# Patient Record
Sex: FEMALE | Race: WHITE | Employment: FULL TIME | URBAN - METROPOLITAN AREA
[De-identification: names, ages, dates, MRNs, and addresses within clinical notes are randomized per-mention and may not be internally consistent; named-entity substitution may affect disease eponyms.]

---

## 2017-01-31 ENCOUNTER — OFFICE VISIT (OUTPATIENT)
Dept: MIDWIFE SERVICES | Age: 27
End: 2017-01-31

## 2017-01-31 VITALS
WEIGHT: 192 LBS | HEART RATE: 78 BPM | DIASTOLIC BLOOD PRESSURE: 71 MMHG | BODY MASS INDEX: 30.99 KG/M2 | SYSTOLIC BLOOD PRESSURE: 130 MMHG

## 2017-01-31 DIAGNOSIS — N94.6 DYSMENORRHEA: Primary | ICD-10-CM

## 2017-01-31 RX ORDER — ETONOGESTREL AND ETHINYL ESTRADIOL 11.7; 2.7 MG/1; MG/1
INSERT, EXTENDED RELEASE VAGINAL
Qty: 3 DEVICE | Refills: 4 | Status: SHIPPED | OUTPATIENT
Start: 2017-01-31 | End: 2019-04-29 | Stop reason: SDUPTHER

## 2017-01-31 NOTE — PROGRESS NOTES
Chief Complaint   Patient presents with   Nathanael Campos IUD     may want it removed     Visit Vitals    /71    Pulse 78    Wt 192 lb (87.1 kg)    Breastfeeding No    BMI 30.99 kg/m2     1. Have you been to the ER, urgent care clinic since your last visit? Hospitalized since your last visit? No    2. Have you seen or consulted any other health care providers outside of the [address] [place] [address] since your last visit? Include any pap smears or colon screening. No     Here today for IUD check but may want it out. She states that her cramps are much worse than before she had it placed.

## 2017-02-01 NOTE — PROCEDURES
IUD Removal      Pre op:  Frequent heavy menses    Post op: same    Procedure: Removal of IUD    Surgeon: Dr. Valarie Chavez    Findings:  IUD removal suture is visible at [facility]    Anesthesia: None    Blood loss: None    Complications: None    Blood pressure 141/84, pulse 91, height 5' 3\" (1.6 m), weight 162 lb (73.483 kg). Procedure:     After informed consent, the patient was positioned and the cervix was visualized and cleaned with betadine. The IUD removal suture was visible posterior to the cervical OS. The suture was grasped and the IUD was removed in total without complications. The patient tolerated the procedure with minimal cramping and was allowed to rest postprocedure. Zac Chanel was seen today for checkup iud. Diagnoses and all orders for this visit:    Dysmenorrhea  -     ethinyl estradiol-etonogestrel (NUVARING) 0.12-0.015 mg/24 hr vaginal ring; Place one ring in vagina on first of month and replace 4 weeks later. Edouard Odonnell Keep unused rings refrigerated  Indications: PREGNANCY CONTRACEPTION      Follow-up Disposition: Not on File            Fabian Chavez MD, [address] [place] [address], Retired    [facility] Professor, [address]

## 2017-02-01 NOTE — PROGRESS NOTES
GYN Visit Note          Chief Complaint: Left sided pelvic pain, wants mirena out    HPI:    Latrice Oliveros  32 y.o. WF     LMP:  2017  Has had Mirena for a couple of yrs and is now having constant left sided pelvic pain and wants it removed    Review of Systems: -    General ROS: positive for  - pelvic pain  negative for - chills, fever or malaise    OBJECTIVE:  Blood pressure 130/71, pulse 78, weight 192 lb (87.1 kg), last menstrual period 2017, not currently breastfeeding. General appearance - alert, well appearing, and in no distress  Abdomen - tenderness noted left side  Pelvic - VULVA: normal appearing vulva with no masses, tenderness or lesions, VAGINA: normal appearing vagina with normal color and discharge, no lesions, CERVIX: normal appearing cervix without discharge or lesions, UTERUS: uterus is normal size, shape, consistency and nontender, IUD properly positioned in uterus, ADNEXA: small cyst in right ovary, left ovary not well visualized, exam chaperoned by Minnie Bacon RN     ASSESSMENT / PLAN:    Dl Quintero was seen today for checkup iud. Diagnoses and all orders for this visit:    Dysmenorrhea  -     ethinyl estradiol-etonogestrel (NUVARING) 0.12-0.015 mg/24 hr vaginal ring; Place one ring in vagina on first of month and replace 4 weeks later. Micah Castellanos Keep unused rings refrigerated  Indications: PREGNANCY CONTRACEPTION  -     [id number] - REMOVE INTRAUTERINE DEVICE      Follow-up Disposition:  Return in about 6 months (around 2017) for Annual exam.            Sulma Mary.  Joe Cortez MD, [address] [place] [address], Retired    Felisa Atwood Professor, [address]

## 2017-02-01 NOTE — PROCEDURES
Gyn Report [address] [facility] Page  [facility]  Patient / Exam Information Date of Exam: 2017  Name: [facility]. Phys: Pedro MARR  Patient ID: [id number] : 1990 Ref. Phys:  2nd Pat. ID: Age: 32 Sonographer: Pedro MARR  Indication: Sex: Female Exam Type: pelvic pain  LMP  LMP 2017 Day of Cycle  2 AB  Day of stim. Expected Ovul. Para 1 Ectopic  2D Measurements Value m1 m2 m3 m4 m5 m6 Meth. Uterus  Length 4.31 cm 3.58 5.05 avg. Width 4.80 cm 4.80 avg. Height 3.39 cm 3.03 3.75 avg. Volume 36.764 cm³ 27.262  Cervix Length 2.10 cm 2.10 avg. Left Ovary  Length 2.45 cm 2.45 avg. Width 2.59 cm 2.59 avg. Height 1.68 cm 1.68 avg. Volume 5.582 cm³ 5.582  Right Ovary  Length 3.14 cm 3.14 avg.  2D Measurements - Follicles  Right Follicles Avg Vol 1.360 cm³ Total Vol 3.698 cm³  D1 D2 D3 Avg. D Vol  Follicle 1 89.2 mm 99.7 mm 16.3 mm 2.223 cm³  Follicle 2 69.6 mm 28.7 mm 16.3 mm 1.876 cm³    Impression  Realtime transvaginal us of pelvis, showed normal appearing uterus with IUD properly positioned, small simple cyst in right ovary, difficult to visualize left ovary may be due to multiple cyst  Recommendation  Comply with patient desire to have IUD removed and place an suppression therapy and rescan in 6 months. Date: 2017 Perf.  Physician: Pedro MARR Sonographer: Pedro MARR

## 2017-02-03 ENCOUNTER — TELEPHONE (OUTPATIENT)
Dept: MIDWIFE SERVICES | Age: 27
End: 2017-02-03

## 2017-02-03 RX ORDER — NORETHINDRONE ACETATE AND ETHINYL ESTRADIOL 1MG-20(21)
1 KIT ORAL DAILY
Qty: 1 DOSE PACK | Refills: 2 | Status: SHIPPED | OUTPATIENT
Start: 2017-02-03 | End: 2017-05-03 | Stop reason: SDUPTHER

## 2017-02-03 NOTE — TELEPHONE ENCOUNTER
Pt calling stating Melville Lisa is not covered by insurance and would like to know if pills can be called into [facility] pharmacy listed instead. Please call.

## 2017-02-10 ENCOUNTER — TELEPHONE (OUTPATIENT)
Dept: MIDWIFE SERVICES | Age: 27
End: 2017-02-10

## 2017-02-10 RX ORDER — NORETHINDRONE ACETATE AND ETHINYL ESTRADIOL 1MG-20(21)
1 KIT ORAL DAILY
Qty: 1 DOSE PACK | Refills: 2 | OUTPATIENT
Start: 2017-02-10

## 2017-02-10 NOTE — TELEPHONE ENCOUNTER
Spoke with khadar and let her know that her bcp was called in on that last day she called on feb 3rd. She was grateful and had no questions for me.

## 2017-05-10 RX ORDER — NORETHINDRONE ACETATE AND ETHINYL ESTRADIOL AND FERROUS FUMARATE 1MG-20(21)
KIT ORAL
Qty: 28 TAB | Refills: 2 | Status: SHIPPED | OUTPATIENT
Start: 2017-05-10 | End: 2018-02-17 | Stop reason: SDUPTHER

## 2018-02-15 DIAGNOSIS — Z30.9 ENCOUNTER FOR CONTRACEPTIVE MANAGEMENT, UNSPECIFIED TYPE: Primary | ICD-10-CM

## 2018-02-15 NOTE — TELEPHONE ENCOUNTER
Patient is requesting refill of her Junel FE to hold her over until next AE. Original request was denied due to AE being due. Usually sees Curtis Amor MD    Patient has not scheduled updated AE date, will check again later.      [facility]

## 2018-02-16 NOTE — TELEPHONE ENCOUNTER
Pt is scheduled to see Dr. Ana Castaneda on Thursday 02/22/2018 and is requesting a refill of Junel FE and would like it sent to [facility] on file. Please call.

## 2018-02-17 RX ORDER — NORETHINDRONE ACETATE AND ETHINYL ESTRADIOL 1MG-20(21)
1 KIT ORAL DAILY
Qty: 28 TAB | Refills: 0 | Status: SHIPPED | OUTPATIENT
Start: 2018-02-17 | End: 2019-04-29 | Stop reason: SDUPTHER

## 2018-02-17 NOTE — TELEPHONE ENCOUNTER
Spoke with client on telephone. Aware refill of LENARD was sent to pharmacy. Client verbalized understanding.

## 2018-02-22 ENCOUNTER — OFFICE VISIT (OUTPATIENT)
Dept: OBGYN CLINIC | Age: 28
End: 2018-02-22

## 2018-02-22 VITALS
BODY MASS INDEX: 32.62 KG/M2 | HEIGHT: 66 IN | DIASTOLIC BLOOD PRESSURE: 68 MMHG | SYSTOLIC BLOOD PRESSURE: 120 MMHG | WEIGHT: 203 LBS

## 2018-02-22 DIAGNOSIS — Z01.419 ENCOUNTER FOR GYNECOLOGICAL EXAMINATION (GENERAL) (ROUTINE) WITHOUT ABNORMAL FINDINGS: Primary | ICD-10-CM

## 2018-02-22 DIAGNOSIS — Z30.9 ENCOUNTER FOR CONTRACEPTIVE MANAGEMENT, UNSPECIFIED TYPE: ICD-10-CM

## 2018-02-22 RX ORDER — ESCITALOPRAM OXALATE 10 MG/1
10 TABLET ORAL DAILY
Refills: 3 | Status: ON HOLD | COMMUNITY
Start: 2018-01-31 | End: 2021-03-04

## 2018-02-22 RX ORDER — DESOGESTREL AND ETHINYL ESTRADIOL 0.15-0.03
1 KIT ORAL DAILY
Qty: 3 DOSE PACK | Refills: 4 | Status: SHIPPED | OUTPATIENT
Start: 2018-02-22 | End: 2019-04-18

## 2018-02-22 NOTE — PATIENT INSTRUCTIONS

## 2018-02-22 NOTE — PROGRESS NOTES
Annual exam ages 21-44    [address] is a ,  32 y.o. female Formerly named [facility] Patient's last menstrual period was 2018 (exact date). .    She presents for her annual checkup. She is having no significant problems. Recently BTB for 2 cycles. With regard to the Gardasil vaccine, she is older than the FDA approved age to receive it. Menstrual status:    Her periods are light, moderate in flow. She is using one to two pads or tampons per day, usually regular and last 26-30 days. She denies dysmenorrhea. She reports no premenstrual symptoms. Contraception:    The current method of family planning is OCP (estrogen/progesterone). Sexual history:     She  reports that she currently engages in sexual activity and has had male partners. She reports using the following method of birth control/protection: Pill. .    Medical conditions:    Since her last annual GYN exam about one year ago, she has not the following changes in her health history: none. Pap and Mammogram History:    Her most recent Pap smear was normal, obtained 4 year(s) ago. The patient has never had a mammogram.    Breast Cancer History/Substance Abuse: negative    Past Medical History:   Diagnosis Date    AR (allergic rhinitis)     Asthma     Carpal tunnel syndrome on both sides      History reviewed. No pertinent surgical history. Current Outpatient Prescriptions   Medication Sig Dispense Refill    escitalopram oxalate (LEXAPRO) 10 mg tablet Take 10 mg by mouth daily. 3    norethindrone-ethinyl estradiol (JUNEL FE 1/20, 28,) 1 mg-20 mcg (21)/75 mg (7) tab Take 1 Tab by mouth daily. 28 Tab 0    ethinyl estradiol-etonogestrel (NUVARING) 0.12-0.015 mg/24 hr vaginal ring Place one ring in vagina on first of month and replace 4 weeks later. Tatyana Mccullough Keep unused rings refrigerated  Indications: PREGNANCY CONTRACEPTION 3 Device 4    ibuprofen (MOTRIN) 800 mg tablet Take 1 tablet by mouth every eight (8) hours. 30 tablet 1    oxycodone-acetaminophen (PERCOCET) 5-325 mg per tablet Take 1 tablet by mouth every six (6) hours as needed for Pain. 10 tablet 0     Allergies: Review of patient's allergies indicates no known allergies. Tobacco History:  reports that she has never smoked. She has never used smokeless tobacco.  Alcohol Abuse:  reports that she does not drink alcohol. Drug Abuse:  reports that she does not use illicit drugs.     Family Medical/Cancer History:   Family History   Problem Relation Age of Onset    Cancer Mother      cervical CA    Cancer Maternal Grandfather      prostate    Hypertension Maternal Grandfather     Diabetes Maternal Grandfather     Heart Disease Maternal Grandfather      AFIB    Stroke Maternal Grandmother     Hypertension Maternal Grandmother     High Cholesterol Maternal Grandmother         Review of Systems - History obtained from the patient  Constitutional: negative for weight loss, fever, night sweats  HEENT: negative for hearing loss, earache, congestion, snoring, sorethroat  CV: negative for chest pain, palpitations, edema  Resp: negative for cough, shortness of breath, wheezing  GI: negative for change in bowel habits, abdominal pain, black or bloody stools  : negative for frequency, dysuria, hematuria, vaginal discharge  MSK: negative for back pain, joint pain, muscle pain  Breast: negative for breast lumps, nipple discharge, galactorrhea  Skin :negative for itching, rash, hives  Neuro: negative for dizziness, headache, confusion, weakness  Psych: negative for anxiety, depression, change in mood  Heme/lymph: negative for bleeding, bruising, pallor    Physical Exam    Visit Vitals    /68    Ht 5' 6\" (1.676 m)    Wt 203 lb (92.1 kg)    LMP 02/02/2018 (Exact Date)    BMI 32.77 kg/m2       Constitutional  · Appearance: well-nourished, well developed, alert, in no acute distress    HENT  · Head and Face: appears normal    Neck  · Inspection/Palpation: normal appearance, no masses or tenderness  · Lymph Nodes: no lymphadenopathy present  · Thyroid: gland size normal, nontender, no nodules or masses present on palpation    Chest  · Respiratory Effort: breathing unlabored  · Auscultation: normal breath sounds    Cardiovascular  · Heart:  · Auscultation: regular rate and rhythm without murmur    Breasts  · Inspection of Breasts: breasts symmetrical, no skin changes, no discharge present, nipple appearance normal, no skin retraction present  · Palpation of Breasts and Axillae: no masses present on palpation, no breast tenderness  · Axillary Lymph Nodes: no lymphadenopathy present    Gastrointestinal  · Abdominal Examination: abdomen non-tender to palpation, normal bowel sounds, no masses present  · Liver and spleen: no hepatomegaly present, spleen not palpable  · Hernias: no hernias identified    Genitourinary  · External Genitalia: normal appearance for age, no discharge present, no tenderness present, no inflammatory lesions present, no masses present, no atrophy present  · Vagina: normal vaginal vault without central or paravaginal defects, no discharge present, no inflammatory lesions present, no masses present  · Bladder: non-tender to palpation  · Urethra: appears normal  · Cervix: normal   · Uterus: normal size, shape and consistency  · Adnexa: no adnexal tenderness present, no adnexal masses present  · Perineum: perineum within normal limits, no evidence of trauma, no rashes or skin lesions present  · Anus: anus within normal limits, no hemorrhoids present  · Inguinal Lymph Nodes: no lymphadenopathy present    Skin  · General Inspection: no rash, no lesions identified    Neurologic/Psychiatric  · Mental Status:  · Orientation: grossly oriented to person, place and time  · Mood and Affect: mood normal, affect appropriate    .   Assessment:  Routine gynecologic examination  Her current medical status is satisfactory with no evidence of significant gynecologic issues except some recent BTB. Plan: Will switch to A[employer].   Counseled re: diet, exercise, healthy lifestyle  Return for yearly wellness visits  Gardisil counseling provided  Pt counseled regarding co-testing for high risk HPV with pap  Rec screening mammo at either 35 or 40

## 2018-02-27 LAB
CYTOLOGIST CVX/VAG CYTO: NORMAL
CYTOLOGY CVX/VAG DOC THIN PREP: NORMAL
DX ICD CODE: NORMAL
LABCORP, 190119: NORMAL
Lab: NORMAL
Lab: NORMAL
OTHER STN SPEC: NORMAL
PATH REPORT.FINAL DX SPEC: NORMAL
STAT OF ADQ CVX/VAG CYTO-IMP: NORMAL

## 2019-04-18 ENCOUNTER — TELEPHONE (OUTPATIENT)
Dept: OBGYN CLINIC | Age: 29
End: 2019-04-18

## 2019-04-18 RX ORDER — DESOGESTREL AND ETHINYL ESTRADIOL 0.15-0.03
1 KIT ORAL DAILY
Qty: 1 DOSE PACK | Refills: 0 | Status: SHIPPED | OUTPATIENT
Start: 2019-04-18 | End: 2019-04-29 | Stop reason: SDUPTHER

## 2019-04-18 NOTE — TELEPHONE ENCOUNTER
Patient of [address] [place] [address] Dr Mijares. She needs refill of her OCP to last her until her next AE 4/29/19. [facility]  Last AE 2/22/18        RX has been sent and confirmed receipt.

## 2019-04-25 NOTE — PROGRESS NOTES
Annual exam ages 21-44    [address] is a ,  34 y.o. female [facility] Patient's last menstrual period was 2019 (exact date). .    She presents for her annual checkup. She is having no significant problems. With regard to the Gardasil vaccine, she received 1 of the 3 injections. Menstrual status:    Her periods are light, moderate in flow. She is using one to two pads or tampons per day, usually regular and last 26-30 days. She denies dysmenorrhea. She reports no premenstrual symptoms. Contraception:    The current method of family planning is OCP (estrogen/progesterone). Sexual history:     She  reports that she currently engages in sexual activity and has had partners who are Male. She reports using the following method of birth control/protection: Pill. .    Medical conditions:    Since her last annual GYN exam about one year ago, she has not the following changes in her health history: none. Pap and Mammogram History:    Her most recent Pap smear was normal, obtained 1 year(s) ago. The patient has never had a mammogram.    Breast Cancer History/Substance Abuse: negative    Past Medical History:   Diagnosis Date    AR (allergic rhinitis)     Asthma     Carpal tunnel syndrome on both sides     HPV vaccine counseling     Gardasil 1/3    Routine Papanicolaou smear 18 neg     History reviewed. No pertinent surgical history. Current Outpatient Medications   Medication Sig Dispense Refill    desogestrel-ethinyl estradiol (DESOGEN) 0.15-0.03 mg tab Take 1 Tab by mouth daily. 1 Dose Pack 0    escitalopram oxalate (LEXAPRO) 10 mg tablet Take 10 mg by mouth daily. 3    ibuprofen (MOTRIN) 800 mg tablet Take 1 tablet by mouth every eight (8) hours. (Patient not taking: Reported on 2019) 30 tablet 1    oxycodone-acetaminophen (PERCOCET) 5-325 mg per tablet Take 1 tablet by mouth every six (6) hours as needed for Pain.  (Patient not taking: Reported on 4/29/2019) 10 tablet 0     Allergies: Patient has no known allergies. Tobacco History:  reports that she has never smoked. She has never used smokeless tobacco.  Alcohol Abuse:  reports that she does not drink alcohol. Drug Abuse:  reports that she does not use drugs.     Family Medical/Cancer History:   Family History   Problem Relation Age of Onset    Cancer Mother         cervical CA    Hypertension Maternal Grandfather     Diabetes Maternal Grandfather     Atrial Fibrillation Maternal Grandfather     Prostate Cancer Maternal Grandfather     Stroke Maternal Grandmother     Hypertension Maternal Grandmother     High Cholesterol Maternal Grandmother         Review of Systems - History obtained from the patient  Constitutional: negative for weight loss, fever, night sweats  HEENT: negative for hearing loss, earache, congestion, snoring, sorethroat  CV: negative for chest pain, palpitations, edema  Resp: negative for cough, shortness of breath, wheezing  GI: negative for change in bowel habits, abdominal pain, black or bloody stools  : negative for frequency, dysuria, hematuria, vaginal discharge  MSK: negative for back pain, joint pain, muscle pain  Breast: negative for breast lumps, nipple discharge, galactorrhea  Skin :negative for itching, rash, hives  Neuro: negative for dizziness, headache, confusion, weakness  Psych: negative for anxiety, depression, change in mood  Heme/lymph: negative for bleeding, bruising, pallor    Physical Exam    Visit Vitals  /70   Ht 5' 6\" (1.676 m)   Wt 230 lb 3.2 oz (104.4 kg)   LMP 04/18/2019 (Exact Date)   BMI 37.16 kg/m²       Constitutional  · Appearance: well-nourished, well developed, alert, in no acute distress    HENT  · Head and Face: appears normal    Neck  · Inspection/Palpation: normal appearance, no masses or tenderness  · Lymph Nodes: no lymphadenopathy present  · Thyroid: gland size normal, nontender, no nodules or masses present on palpation    Chest  · Respiratory Effort: breathing unlabored  · Auscultation: normal breath sounds    Cardiovascular  · Heart:  · Auscultation: regular rate and rhythm without murmur    Breasts  · Inspection of Breasts: breasts symmetrical, no skin changes, no discharge present, nipple appearance normal, no skin retraction present  · Palpation of Breasts and Axillae: no masses present on palpation, no breast tenderness  · Axillary Lymph Nodes: no lymphadenopathy present    Gastrointestinal  · Abdominal Examination: abdomen non-tender to palpation, normal bowel sounds, no masses present  · Liver and spleen: no hepatomegaly present, spleen not palpable  · Hernias: no hernias identified    Genitourinary  · External Genitalia: normal appearance for age, no discharge present, no tenderness present, no inflammatory lesions present, no masses present, no atrophy present  · Vagina: normal vaginal vault without central or paravaginal defects, no discharge present, no inflammatory lesions present, no masses present  · Bladder: non-tender to palpation  · Urethra: appears normal  · Cervix: normal   · Uterus: normal size, shape and consistency  · Adnexa: no adnexal tenderness present, no adnexal masses present  · Perineum: perineum within normal limits, no evidence of trauma, no rashes or skin lesions present  · Anus: anus within normal limits, no hemorrhoids present  · Inguinal Lymph Nodes: no lymphadenopathy present    Skin  · General Inspection: no rash, no lesions identified    Neurologic/Psychiatric  · Mental Status:  · Orientation: grossly oriented to person, place and time  · Mood and Affect: mood normal, affect appropriate    . Assessment:  Routine gynecologic examination  Her current medical status is satisfactory with no evidence of significant gynecologic issues.     Plan:  Counseled re: diet, exercise, healthy lifestyle  Return for yearly wellness visits  Gardisil counseling provided  Pt counseled regarding co-testing for high risk HPV with pap  Rec screening mammo at either 35 or 40

## 2019-04-29 ENCOUNTER — OFFICE VISIT (OUTPATIENT)
Dept: OBGYN CLINIC | Age: 29
End: 2019-04-29

## 2019-04-29 VITALS
HEIGHT: 66 IN | DIASTOLIC BLOOD PRESSURE: 70 MMHG | SYSTOLIC BLOOD PRESSURE: 130 MMHG | BODY MASS INDEX: 37 KG/M2 | WEIGHT: 230.2 LBS

## 2019-04-29 DIAGNOSIS — Z01.419 ENCOUNTER FOR GYNECOLOGICAL EXAMINATION (GENERAL) (ROUTINE) WITHOUT ABNORMAL FINDINGS: Primary | ICD-10-CM

## 2019-04-29 PROBLEM — E66.01 SEVERE OBESITY (HCC): Status: ACTIVE | Noted: 2019-04-29

## 2019-04-29 RX ORDER — DESOGESTREL AND ETHINYL ESTRADIOL 0.15-0.03
1 KIT ORAL DAILY
Qty: 3 DOSE PACK | Refills: 4 | Status: SHIPPED | OUTPATIENT
Start: 2019-04-29 | End: 2020-07-06 | Stop reason: SDUPTHER

## 2019-04-29 NOTE — PATIENT INSTRUCTIONS

## 2020-07-06 ENCOUNTER — PATIENT MESSAGE (OUTPATIENT)
Dept: OBGYN CLINIC | Age: 30
End: 2020-07-06

## 2020-07-06 RX ORDER — DESOGESTREL AND ETHINYL ESTRADIOL 0.15-0.03
1 KIT ORAL DAILY
Qty: 3 DOSE PACK | Refills: 0 | Status: SHIPPED | OUTPATIENT
Start: 2020-07-06 | End: 2021-03-04

## 2021-02-17 NOTE — PROGRESS NOTES
Current pregnancy history: 
 
Ashli Chowdhury is a 27 y.o. female who presents for the evaluation of pregnancy. No LMP recorded. LMP history: 
The date of her LMP is *** certain. Her last menstrual period was normal and lasted for 4 to 5 days. A urine pregnancy test was positive *** weeks ago. She was not on the pill at conception. Based on her LMP, her EDC is *** and her EGA is *** weeks,*** days. Her menstrual cycles are regular and occur approximately every 28 days  and range from 3 to 5 days. The last menses lasted *** the usual number of days. Ultrasound data: 
She had an  ultrasound done by the physician today which revealed a viable naav pregnancy with a gestational age of *** weeks and *** days giving an EDC of ***. Pregnancy symptoms: 
 
Since her LMP she has experienced  urinary frequency, breast tenderness, and nausea. She {HAS/HAS NOT:25107} been vomiting over the last few weeks. Associated signs and symptoms which she denies: dysuria, discharge, vaginal bleeding. She states she has *** gained weight:  Approximately *** pounds over the last few weeks. Relevant past pregnancy history: She has the following pregnancy history: Her last pregnancy was uncomplicated. She has no history of  delivery. Relevant past medical history:(relevant to this pregnancy): noncontributory. Pap/Occupational history: 
Last pap smear: 3 years ago Results: Normal   
 
 
Substance history: negative for alcohol, tobacco and street drugs. Positive for nothing. *** Exposure history: There is/are no indoor cat/s in the home. The patient was instructed to not change the cat litter. She admits close contact with children on a regular basis. She has had chicken pox or the vaccine in the past.  
Patient denies issues with domestic violence. Genetic Screening/Teratology Counseling: (Includes patient, baby's father, or anyone in either family with:) 1.  Patient's age >/= 28 at [facility]?-- no  . 2. Thalassemia ([facility], [place], [address], or  background): MCV<80?--no.    
3.  Neural tube defect (meningomyelocele, spina bifida, anencephaly)?--no.  
4.  Congenital heart defect?--no. 
5.  Down syndrome?--no.  
6.  Woody-Sachs ([facility])?--no.  
7.  Canavan's Disease?--no.  
8.  Familial Dysautonomia?--no.  
9.  Sickle cell disease or trait ()? --no The patient has not been tested for sickle trait 10. Hemophilia or other blood disorders?--no. 11.  Muscular dystrophy?--no. 12.  Cystic fibrosis?--no. 13.  Antonietta's Chorea?--no. 14.  Mental retardation/autism (if yes was person tested for Fragile X)?--no. 15.  Other inherited genetic or chromosomal disorder?--no. 12.  Maternal metabolic disorder (DM, PKU, etc)?--no. 17.  Patient or FOB with a child with a birth defect not listed above?--no. 
17a. Patient or FOB with a birth defect themselves?--no. 18.  Recurrent pregnancy loss, or stillbirth?--no. 19.  Any medications since LMP other than prenatal vitamins (include vitamins,  supplements, OTC meds, drugs, alcohol)?--no. 20.  Any other genetic/environmental exposure to discuss?--no. Infection History: 1. Lives with someone with TB or TB exposed?--no.  
2.  Patient or partner has history of genital herpes?--no. 
3.  Rash or viral illness since LMP?--no.   
4.  History of STD (GC, CT, HPV, syphilis, HIV)? --no  
5. Other: OTHER? Past Medical History:  
Diagnosis Date  AR (allergic rhinitis)  Asthma  Carpal tunnel syndrome on both sides  HPV vaccine counseling Gardasil 1/3  Routine Papanicolaou smear 2/22/18 neg No past surgical history on file. Social History Occupational History  Occupation: [employer]  Tobacco Use  Smoking status: Never Smoker  Smokeless tobacco: Never Used Substance and Sexual Activity  Alcohol use: No  
 Drug use:  No  
  Sexual activity: Yes  
  Partners: Male Birth control/protection: Pill Family History Problem Relation Age of Onset  Cancer Mother   
     cervical CA  Hypertension Maternal Grandfather  Diabetes Maternal Grandfather  Atrial Fibrillation Maternal Grandfather  Prostate Cancer Maternal Grandfather  Stroke Maternal Grandmother  Hypertension Maternal Grandmother  High Cholesterol Maternal Grandmother No Known Allergies Prior to Admission medications Medication Sig Start Date End Date Taking? Authorizing Provider  
desogestreL-ethinyl estradioL (DESOGEN) 0.15-0.03 mg tab Take 1 Tab by mouth daily. Please schedule an annual exam ASAP. 7/6/20   Lowell Vivar MD  
escitalopram oxalate (LEXAPRO) 10 mg tablet Take 10 mg by mouth daily. 1/31/18   Provider, Historical  
ibuprofen (MOTRIN) 800 mg tablet Take 1 tablet by mouth every eight (8) hours. Patient not taking: Reported on 4/29/2019 10/21/14   Carlos Hagen DO Review of Systems: History obtained from the patient Constitutional: negative for weight loss, fever, night sweats HEENT: negative for hearing loss, earache, congestion, snoring, sorethroat CV: negative for chest pain, palpitations, edema Resp: negative for cough, shortness of breath, wheezing Breast: negative for breast lumps, nipple discharge, galactorrhea GI: negative for change in bowel habits, abdominal pain, black or bloody stools : negative for frequency, dysuria, hematuria, vaginal discharge MSK: negative for back pain, joint pain, muscle pain Skin: negative for itching, rash, hives Neuro: negative for dizziness, headache, confusion, weakness Psych: negative for anxiety, depression, change in mood Heme/lymph: negative for bleeding, bruising, pallor Objective: There were no vitals taken for this visit. Physical Exam: PHYSICAL EXAMINATION Constitutional 
 · Appearance: well-nourished, well developed, alert, in no acute distress HENT 
· Head · Face: appears normal 
· Eyes: appear normal 
· Ears: normal 
· Mouth: normal 
· Lips: no lesions Neck · Inspection/Palpation: normal appearance, no masses or tenderness · Lymph Nodes: no lymphadenopathy present · Thyroid: gland size normal, nontender, no nodules or masses present on palpation Chest 
· Respiratory Effort: breathing unlabored · Auscultation: normal breath sounds Cardiovascular · Heart: 
· Auscultation: regular rate and rhythm without murmur Breasts · Inspection of Breasts: breasts symmetrical, no skin changes, no discharge present, nipple appearance normal, no skin retraction present · Palpation of Breasts and Axillae: no masses present on palpation, no breast tenderness · Axillary Lymph Nodes: no lymphadenopathy present Gastrointestinal 
· Abdominal Examination: abdomen non-tender to palpation, normal bowel sounds, no masses present · Liver and spleen: no hepatomegaly present, spleen not palpable · Hernias: no hernias identified Genitourinary · External Genitalia: normal appearance for age, no discharge present, no tenderness present, no inflammatory lesions present, no masses present, no atrophy present · Vagina: normal vaginal vault without central or paravaginal defects, no discharge present, no inflammatory lesions present, no masses present · Bladder: non-tender to palpation · Urethra: appears normal 
· Cervix: normal  
· Uterus: enlarged, normal shape, soft · Adnexa: no adnexal tenderness present, no adnexal masses present · Perineum: perineum within normal limits, no evidence of trauma, no rashes or skin lesions present · Anus: anus within normal limits, no hemorrhoids present · Inguinal Lymph Nodes: no lymphadenopathy present Skin · General Inspection: no rash, no lesions identified Neurologic/Psychiatric · Mental Status: · Orientation: grossly oriented to person, place and time · Mood and Affect: mood normal, affect appropriate Assessment:  
Intrauterine pregnancy with the following problems identified: ***. Plan:  
 
Offered CF testing, CVS, Nuchal Translucency, MSAFP, amnio, and discussed NIPT Course of pregnancy discussed including visit schedule, routine U/S, glucola testing, etc. 
Avoid alcoholic beverages and illicit/recreational drugs use Take prenatal vitamins or folic acid daily. Hospital and practice style discussed with coverage system. Discussed nutrition, toxoplasmosis precautions, sexual activity, exercise, need for influenza vaccine, environmental and work hazards, travel advice, screen for domestic violence, need for seat belts. Discussed seafood, unpasteurized dairy products, deli meat, artificial sweeteners, and caffeine. Information on prenatal classes/breastfeeding given. Information on circumcision given Patient encouraged not to smoke. Discussed current prescription drug use. Given medication list. 
Discussed the use of over the counter medications and chemicals. Route of delivery discussed, including risks, benefits, and alternatives of  versus repeat LTCS. Pt understands risk of hemorrhage during pregnancy and post delivery and would accept blood products if necessary in life-threatening emergencies Handouts given to pt. Transvaginal First Trimester Ultrasound Procedure Jeffy Mcneil is a ,  27 y.o. female [facility] No LMP recorded. This makes her currently *** weeks and *** days of gestation by dates. She is here today for early pregnancy ultrasound for pregnancy dating. Ultrasound results: A nava intrauterine pregnancy with visible cardiac activity is seen. The yolk sac is visible and measures approximately *** cm in diameter. The crown rump length of the fetus is measurable at *** cm, consistent with *** weeks and *** days Subchorionic hemorrhage was not seen Right Ovary - NORMAL Left Ovary - NORMAL Comment:

## 2021-02-19 ENCOUNTER — ROUTINE PRENATAL (OUTPATIENT)
Dept: OBGYN CLINIC | Age: 31
End: 2021-02-19
Payer: COMMERCIAL

## 2021-02-19 VITALS
BODY MASS INDEX: 35.2 KG/M2 | WEIGHT: 219 LBS | DIASTOLIC BLOOD PRESSURE: 62 MMHG | SYSTOLIC BLOOD PRESSURE: 118 MMHG | HEIGHT: 66 IN

## 2021-02-19 DIAGNOSIS — Z32.01 PREGNANCY EXAMINATION OR TEST, POSITIVE RESULT: ICD-10-CM

## 2021-02-19 DIAGNOSIS — O20.0 THREATENED ABORTION: Primary | ICD-10-CM

## 2021-02-19 DIAGNOSIS — N92.6 MISSED MENSES: ICD-10-CM

## 2021-02-19 PROCEDURE — 76817 TRANSVAGINAL US OBSTETRIC: CPT | Performed by: OBSTETRICS & GYNECOLOGY

## 2021-02-19 PROCEDURE — 99214 OFFICE O/P EST MOD 30 MIN: CPT | Performed by: OBSTETRICS & GYNECOLOGY

## 2021-02-19 NOTE — PROGRESS NOTES
Current pregnancy--Threatened AB history:    René Zarate is a 27 y.o. female who presents for the evaluation of pregnancy. Patient's last menstrual period was 2020. LMP history:  The date of her LMP is  certain. Her menstrual cycles are regular and occur approximately every 28 days and range from 3 to 5 days. The last menses did  last the usual number of days. A urine pregnancy test was positive 4 weeks ago. She was not on the pill at conception. Based on her LMP her EGA is  8 weeks and 5 days giving an [facility] of 2021. Ultrasound data:  She had an ultrasound done today. Ultrasound details:    Likely non-viable by US today. Fetus seen measuring consistent with 6 weeks, 0 days, but with no FHT's visible. Pregnancy symptoms:    Since her LMP she has experienced  urinary frequency, breast tenderness, fatigue and nausea. She has not been vomiting over the last few weeks. Associated signs and symptoms which she denies: dysuria, discharge, vaginal bleeding. She states she has gained weight:  Approximately 2 pounds over the last few weeks. Relevant past pregnancy history:  She has the following pregnancy history:  X2  She has no history of  delivery. Relevant past medical history:(relevant to this pregnancy): noncontributory. Declines flu vaccine  Declines genetics  O Negative     Pap/Occupational history:  Last pap smear: Normal  Results: 2018     Her occupation is: . Substance history:  Negative for alcohol, tobacco and street drugs. Positive for nothing. Exposure history: There are no indoor cat/s in the home. The patient was instructed to not change the cat litter. She admits close contact with children on a regular basis. She has had chicken pox and the vaccine in the past.   Patient denies issues with domestic violence.        OB History    Para Term  AB Living   3 2 2 0 0 2   SAB TAB Ectopic Molar Multiple Live Births   0 0 0 0 0 2      # Outcome Date GA Lbr Tez/2nd Weight Sex Delivery Anes PTL Lv   3 Current            2 Term 10/19/14 40w5d  9 lb 11 oz (4.394 kg) M VAGINAL DELI EPIDURAL AN N LIANG      Name: Arpita Cardinal: 9  Apgar5: 9   1 Term 02/17/11 40w6d 11:00 6 lb 12.6 oz (3.08 kg) F VAGINAL DELI EPI N LIANG      Birth Comments: bottle feeding. Name: Torri Smith         Past Medical History:   Diagnosis Date    AR (allergic rhinitis)     Asthma     Carpal tunnel syndrome on both sides     HPV vaccine counseling     Gardasil 1/3    Routine Papanicolaou smear 2/22/18 neg     No past surgical history on file. Social History     Occupational History    Occupation: [employer]    Tobacco Use    Smoking status: Never Smoker    Smokeless tobacco: Never Used   Substance and Sexual Activity    Alcohol use: No    Drug use: No    Sexual activity: Yes     Partners: Male     Birth control/protection: Pill     Family History   Problem Relation Age of Onset    Cancer Mother         cervical CA    Hypertension Maternal Grandfather     Diabetes Maternal Grandfather     Atrial Fibrillation Maternal Grandfather     Prostate Cancer Maternal Grandfather     Stroke Maternal Grandmother     Hypertension Maternal Grandmother     High Cholesterol Maternal Grandmother        No Known Allergies  Prior to Admission medications    Medication Sig Start Date End Date Taking? Authorizing Provider   desogestreL-ethinyl estradioL (DESOGEN) 0.15-0.03 mg tab Take 1 Tab by mouth daily. Please schedule an annual exam ASAP. 7/6/20   Miya Huynh MD   escitalopram oxalate (LEXAPRO) 10 mg tablet Take 10 mg by mouth daily. 1/31/18   Provider, Historical   ibuprofen (MOTRIN) 800 mg tablet Take 1 tablet by mouth every eight (8) hours.   Patient not taking: Reported on 4/29/2019 10/21/14   Mila Quezada DO        Review of Systems: History obtained from the patient  Constitutional: negative for weight loss, fever, night sweats  HEENT: negative for hearing loss, earache, congestion, snoring, sorethroat  CV: negative for chest pain, palpitations, edema  Resp: negative for cough, shortness of breath, wheezing  Breast: negative for breast lumps, nipple discharge, galactorrhea  GI: negative for change in bowel habits, abdominal pain, black or bloody stools  : negative for frequency, dysuria, hematuria, vaginal discharge  MSK: negative for back pain, joint pain, muscle pain  Skin: negative for itching, rash, hives  Neuro: negative for dizziness, headache, confusion, weakness  Psych: negative for anxiety, depression, change in mood  Heme/lymph: negative for bleeding, bruising, pallor    Objective:  Visit Vitals  /62   Ht 5' 6\" (1.676 m)   Wt 219 lb (99.3 kg)   LMP 12/20/2020   BMI 35.35 kg/m²       Physical Exam:   PHYSICAL EXAMINATION    Constitutional  · Appearance: well-nourished, well developed, alert, in no acute distress    HENT  · Head  · Face: appears normal  · Eyes: appear normal  · Ears: normal  · Mouth: normal  · Lips: no lesions    Neck  · Inspection/Palpation: normal appearance, no masses or tenderness  · Lymph Nodes: no lymphadenopathy present  · Thyroid: gland size normal, nontender, no nodules or masses present on palpation    Chest  · Respiratory Effort: breathing unlabored  · Auscultation: normal breath sounds    Cardiovascular  · Heart:  · Auscultation: regular rate and rhythm without murmur    Breasts  · Inspection of Breasts: breasts symmetrical, no skin changes, no discharge present, nipple appearance normal, no skin retraction present  · Palpation of Breasts and Axillae: no masses present on palpation, no breast tenderness  · Axillary Lymph Nodes: no lymphadenopathy present    Gastrointestinal  · Abdominal Examination: abdomen non-tender to palpation, normal bowel sounds, no masses present  · Liver and spleen: no hepatomegaly present, spleen not palpable  · Hernias: no hernias identified    Genitourinary  · External Genitalia: normal appearance for age, no discharge present, no tenderness present, no inflammatory lesions present, no masses present, no atrophy present  · Vagina: normal vaginal vault without central or paravaginal defects, no discharge present, no inflammatory lesions present, no masses present  · Bladder: non-tender to palpation  · Urethra: appears normal  · Cervix: normal   · Uterus: enlarged, normal shape, soft  · Adnexa: no adnexal tenderness present, no adnexal masses present  · Perineum: perineum within normal limits, no evidence of trauma, no rashes or skin lesions present  · Anus: anus within normal limits, no hemorrhoids present  · Inguinal Lymph Nodes: no lymphadenopathy present    Skin  · General Inspection: no rash, no lesions identified    Neurologic/Psychiatric  · Mental Status:  · Orientation: grossly oriented to person, place and time  · Mood and Affect: mood normal, affect appropriate    Assessment:   Intrauterine pregnancy with the following problems identified: Threatened ab by [address]. Plan:     RTO one week for follow up and repeat US. Handouts given to pt. Transvaginal First Trimester Ultrasound Procedure    Betzy Uriarte is a ,  27 y.o. female [facility] Patient's last menstrual period was 2020. This makes her currently 8 weeks and 5 days of gestation by dates. She is here today for early pregnancy ultrasound for suspected non-viability. Ultrasound results: A likely non-viable intrauterine pregnancy with no visible cardiac activity is seen. The fetus is seen--measuring 0.39 cm, consistent with 6 weeks 0 days. The yolk sac is visible and measures 0.50 cm. The crown rump length of the fetus is not measurable. Subchorionic hemorrhage was not seen  Right Ovary - Not well seen  Left Ovary - Not well seen  Comment: a possibly non-viable IUP is present. Needs repeat US in one week.

## 2021-02-23 LAB
C TRACH RRNA CVX QL NAA+PROBE: NEGATIVE
CYTOLOGIST CVX/VAG CYTO: NORMAL
CYTOLOGY CVX/VAG DOC CYTO: NORMAL
CYTOLOGY CVX/VAG DOC THIN PREP: NORMAL
CYTOLOGY HISTORY:: NORMAL
DX ICD CODE: NORMAL
HPV I/H RISK 4 DNA CVX QL PROBE+SIG AMP: NEGATIVE
Lab: NORMAL
N GONORRHOEA RRNA CVX QL NAA+PROBE: NEGATIVE
OTHER STN SPEC: NORMAL
STAT OF ADQ CVX/VAG CYTO-IMP: NORMAL
T VAGINALIS RRNA SPEC QL NAA+PROBE: NEGATIVE

## 2021-02-23 NOTE — PROGRESS NOTES
Threatened AB note    Kash Hayes is a ,  27 y.o. female WHITE Patient's last menstrual period was 2020. making her 9 weeks pregnant. She has had prenatal care. She denies bleeding or cramping. She had a positive pregnancy test 5 weeks ago. . She has had a recent pelvic ultrasound that showed:    Ultrasound results: A likely non-viable intrauterine pregnancy with no visible cardiac activity is seen. The fetus is seen--measuring 0.39 cm, consistent with 6 weeks 0 days. The yolk sac is visible and measures 0.50 cm. The crown rump length of the fetus is not measurable. Subchorionic hemorrhage was not seen  Right Ovary - Not well seen  Left Ovary - Not well seen  Comment: a possibly non-viable IUP is present. Her past medical history is not significant for risk factors for ectopic pregnancy. She has not had pelvic pain. The patient specifically denies right or left pelvic pain. She does not have a history of a spontaneous . She has not had a recent injury or trauma. Additional complaints: none. Past Medical History:   Diagnosis Date    AR (allergic rhinitis)     Asthma     Carpal tunnel syndrome on both sides     HPV vaccine counseling     Gardasil 1/3    Routine Papanicolaou smear 18 neg     History reviewed. No pertinent surgical history.   Social History     Occupational History    Occupation: [employer]    Tobacco Use    Smoking status: Never Smoker    Smokeless tobacco: Never Used   Substance and Sexual Activity    Alcohol use: No    Drug use: No    Sexual activity: Yes     Partners: Male     Birth control/protection: Pill     Family History   Problem Relation Age of Onset    Cancer Mother         cervical CA    Hypertension Maternal Grandfather     Diabetes Maternal Grandfather     Atrial Fibrillation Maternal Grandfather     Prostate Cancer Maternal Grandfather     Stroke Maternal Grandmother     Hypertension Maternal Grandmother     High Cholesterol Maternal Grandmother        No Known Allergies  Prior to Admission medications    Medication Sig Start Date End Date Taking? Authorizing Provider   desogestreL-ethinyl estradioL (DESOGEN) 0.15-0.03 mg tab Take 1 Tab by mouth daily. Please schedule an annual exam ASAP. 20   Maira Munoz MD   escitalopram oxalate (LEXAPRO) 10 mg tablet Take 10 mg by mouth daily. 18   Provider, Historical   ibuprofen (MOTRIN) 800 mg tablet Take 1 tablet by mouth every eight (8) hours. Patient not taking: Reported on 2019 10/21/14   Natalya De Paz DO        Review of Systems: History obtained from the patient  Constitutional: negative for weight loss, fever, night sweats  Breast: negative for breast lumps, nipple discharge, galactorrhea  GI: negative for change in bowel habits, abdominal pain, black or bloody stools  : negative for frequency, dysuria, hematuria, vaginal discharge  MSK: negative for back pain, joint pain, muscle pain  Skin: negative for itching, rash, hives  Psych: negative for anxiety, depression, change in mood      Objective:  Visit Vitals  LMP 2020       Physical Exam:   PHYSICAL EXAMINATION    Constitutional  · Appearance: well-nourished, well developed, alert, in no acute distress    Skin  · General Inspection: no rash, no lesions identified    Neurologic/Psychiatric  · Mental Status:  · Orientation: grossly oriented to person, place and time  · Mood and Affect: mood normal, affect appropriate    Assessment:   Missed AB  Discussed options including D & C or observation. Plan:   RTO: PRN  Rx Rhogam.      Transvaginal First Trimester Ultrasound Procedure    Radha Sewell is a ,  27 y.o. female WHITE Patient's last menstrual period was 2020.   This makes her currently 9 weeks and 5 days of gestation by dates. She is here today for early pregnancy ultrasound for suspected non-viability. Ultrasound results: A non-viable intrauterine pregnancy with no visible cardiac activity is seen. The fetus is not seen. The yolk sac is not visible. The crown rump length of the fetus is not measurable.    Subchorionic hemorrhage was not seen  Comment: non-viable IUP is confirmed

## 2021-02-26 ENCOUNTER — OFFICE VISIT (OUTPATIENT)
Dept: OBGYN CLINIC | Age: 31
End: 2021-02-26
Payer: COMMERCIAL

## 2021-02-26 DIAGNOSIS — Z29.13 NEED FOR RHOGAM DUE TO RH NEGATIVE MOTHER: ICD-10-CM

## 2021-02-26 DIAGNOSIS — O02.1 MISSED AB: Primary | ICD-10-CM

## 2021-02-26 DIAGNOSIS — O20.0 THREATENED ABORTION: ICD-10-CM

## 2021-02-26 PROCEDURE — 76817 TRANSVAGINAL US OBSTETRIC: CPT | Performed by: OBSTETRICS & GYNECOLOGY

## 2021-02-26 PROCEDURE — 90384 RH IG FULL-DOSE IM: CPT | Performed by: OBSTETRICS & GYNECOLOGY

## 2021-02-26 PROCEDURE — 99214 OFFICE O/P EST MOD 30 MIN: CPT | Performed by: OBSTETRICS & GYNECOLOGY

## 2021-02-26 NOTE — PATIENT INSTRUCTIONS
Miscarriage: Care Instructions  Overview     For some, the loss of a pregnancy can be very hard. You may wonder why it happened. Miscarriages are common and are not caused by exercise, stress, or sex. Most happen because the fertilized egg in the uterus does not develop normally. There is no treatment that can stop a miscarriage. If you are having a miscarriage, you have several options. As long as you do not have heavy blood loss, fever, weakness, or other signs of infection, you can let a miscarriage follow its own course. This can take several days. If you don't want to wait, you can take medicine to help the pregnancy tissue pass. Or you can have a surgical procedure to remove the tissue. Your body will recover over the next several weeks. Having a miscarriage does not mean you cannot have a normal pregnancy in the future. Follow-up care is a key part of your treatment and safety. Be sure to make and go to all appointments, and call your doctor if you are having problems. It's also a good idea to know your test results and keep a list of the medicines you take. How can you care for yourself at home? · You will probably have some vaginal bleeding for 1 to 2 weeks. It may be similar to or slightly heavier than a normal period. The bleeding should get lighter after a week. Use sanitary pads until you stop bleeding. Using pads makes it easier to monitor your bleeding. · Take an over-the-counter pain medicine, such as acetaminophen (Tylenol), ibuprofen (Advil, Motrin), or naproxen (Aleve) for cramps. Read and follow all instructions on the label. You may have cramps for several days after the miscarriage. · Do not take two or more pain medicines at the same time unless the doctor told you to. Many pain medicines have acetaminophen, which is Tylenol. Too much acetaminophen (Tylenol) can be harmful. · Ask your doctor when it is okay for you to have sex.   · You may return to your normal activities if you feel well enough to do so. · If you would like to try to get pregnant again, it is usually safe whenever you feel ready. Talk with your doctor about any future pregnancy plans. · If you do not want to get pregnant, ask your doctor about birth control. You can get pregnant again before your next period starts if you are not using birth control. · You may be low in iron because of blood loss. Eat a balanced diet that is high in iron and vitamin C. Foods rich in iron include red meat, shellfish, eggs, beans, and leafy green vegetables. Foods high in vitamin C include citrus fruits, tomatoes, and broccoli. Talk to your doctor about whether you need to take iron pills or a multivitamin. · For some, the loss of a pregnancy can be very hard. You may have a range of emotions. If you need help coping, talking to family members, friends, a counselor, or your doctor may help. When should you call for help? Call 911 anytime you think you may need emergency care. For example, call if:    · You passed out (lost consciousness). Call your doctor now or seek immediate medical care if:    · You have severe vaginal bleeding.     · You are dizzy or lightheaded, or you feel like you may faint.     · You have new or worse pain in your belly or pelvis.     · You have a fever.     · You have vaginal discharge that smells bad. Watch closely for changes in your health, and be sure to contact your doctor if:    · You do not get better as expected. Where can you learn more? Go to http://www.gray.com/  Enter E802 in the search box to learn more about \"Miscarriage: Care Instructions. \"  Current as of: February 11, 2020               Content Version: 12.6  © 4479-8283 [employer]. Care instructions adapted under license by [employer] (which disclaims liability or warranty for this information).  If you have questions about a medical condition or this instruction, always ask your healthcare professional. [address] any warranty or liability for your use of this information.

## 2021-02-26 NOTE — PROGRESS NOTES
Administered 1500 International Units of RhoGam per MD order. Verbal consent received by patient. Injection given IM in the left gluteus . Patient tolerated well, no complications, no side effects.     Lot # [id number]  Exp: 5/16/22

## 2021-03-03 ENCOUNTER — OFFICE VISIT (OUTPATIENT)
Dept: OBGYN CLINIC | Age: 31
End: 2021-03-03

## 2021-03-03 DIAGNOSIS — O02.1 MISSED AB: Primary | ICD-10-CM

## 2021-03-03 PROCEDURE — 99213 OFFICE O/P EST LOW 20 MIN: CPT | Performed by: OBSTETRICS & GYNECOLOGY

## 2021-03-03 PROCEDURE — 76817 TRANSVAGINAL US OBSTETRIC: CPT | Performed by: OBSTETRICS & GYNECOLOGY

## 2021-03-03 NOTE — PATIENT INSTRUCTIONS
Threatened Miscarriage: Care Instructions  Overview     Some women have light spotting or bleeding during the first 12 weeks of pregnancy. In some cases this is normal. Light spotting or bleeding can also be a sign of a possible loss of the pregnancy. This is called a threatened miscarriage. At this point, the doctor may not be able to tell if your vaginal bleeding is normal or is a sign of a miscarriage. In early pregnancy, things such as stress, exercise, and sex do not cause miscarriage. You may be worried or upset about the possibility of losing your pregnancy. But do not blame yourself. There is no treatment to stop a miscarriage. If you do have a miscarriage, there was nothing you could have done to prevent it. A miscarriage usually means that the pregnancy is not developing normally. Follow-up care is a key part of your treatment and safety. Be sure to make and go to all appointments, and call your doctor if you are having problems. It's also a good idea to know your test results and keep a list of the medicines you take. How can you care for yourself at home? · Take acetaminophen (Tylenol) for cramps. Read and follow all instructions on the label. · Do not take two or more pain medicines at the same time unless the doctor told you to. Many pain medicines have acetaminophen, which is Tylenol. Too much acetaminophen (Tylenol) can be harmful. · Do not have sex until your doctor says it is okay. · Get lots of rest over the next several days. · You may do your normal activities if you feel well enough to do them. But do not do any heavy exercise until your doctor says it is okay. · Eat a balanced diet that is high in iron and vitamin C. Foods rich in iron include red meat, shellfish, eggs, beans, and leafy green vegetables. Foods high in vitamin C include citrus fruits, tomatoes, and broccoli. Talk to your doctor about whether you need to take iron pills or a multivitamin.   · Do not drink alcohol or use tobacco or illegal drugs. · Do not smoke. If you need help quitting, talk to your doctor about stop-smoking programs and medicines. These can increase your chances of quitting for good. When should you call for help? Call 911 anytime you think you may need emergency care. For example, call if:    · You passed out (lost consciousness). Call your doctor now or seek immediate medical care if:    · You have severe vaginal bleeding.     · You are dizzy or lightheaded, or you feel like you may faint.     · You have new or worse pain in your belly or pelvis.     · You have a fever.     · You have vaginal discharge that smells bad. Watch closely for changes in your health, and be sure to contact your doctor if:    · You do not get better as expected. Where can you learn more? Go to http://www.gray.com/  Enter [id number] in the search box to learn more about \"Threatened Miscarriage: Care Instructions. \"  Current as of: February 11, 2020               Content Version: 12.6  © 2006-2020 [employer]. Care instructions adapted under license by [employer] (which disclaims liability or warranty for this information). If you have questions about a medical condition or this instruction, always ask your healthcare professional. [address] any warranty or liability for your use of this information.

## 2021-03-03 NOTE — PROGRESS NOTES
Threatened AB note    Vikki Oliva is a ,  27 y.o. female WHITE No LMP recorded. making her 9 weeks pregnant. She has had prenatal care. She is here for confirmation that this pregnancy is still not viable. She denies bleeding or cramping. She had a positive pregnancy test 5 weeks ago. She has had a recent pelvic ultrasound that showed:    Ultrasound results today showed:  A single nonviable 6w0d IUP is seen with absent cardiac rhythm. Gestational age is based on todays US. A normal yolk sac is seen. Bilateral ovaries are not seen due to bowel gas. The right and left adnexas appear normal.  No free fluid seen in the CDS    Her past medical history is not significant for risk factors for ectopic pregnancy. She has not had pelvic pain. The patient specifically denies right or left pelvic pain. She does not have a history of a spontaneous . She has not had a recent injury or trauma. Additional complaints: none. Past Medical History:   Diagnosis Date    AR (allergic rhinitis)     Asthma     Carpal tunnel syndrome on both sides     HPV vaccine counseling     Gardasil 1/3    Routine Papanicolaou smear 18 neg     History reviewed. No pertinent surgical history.   Social History     Occupational History    Occupation: [employer]    Tobacco Use    Smoking status: Never Smoker    Smokeless tobacco: Never Used   Substance and Sexual Activity    Alcohol use: No    Drug use: No    Sexual activity: Yes     Partners: Male     Birth control/protection: Pill     Family History   Problem Relation Age of Onset    Cancer Mother         cervical CA    Hypertension Maternal Grandfather     Diabetes Maternal Grandfather     Atrial Fibrillation Maternal Grandfather     Prostate Cancer Maternal Grandfather  Stroke Maternal Grandmother     Hypertension Maternal Grandmother     High Cholesterol Maternal Grandmother        No Known Allergies  Prior to Admission medications    Medication Sig Start Date End Date Taking? Authorizing Provider   desogestreL-ethinyl estradioL (DESOGEN) 0.15-0.03 mg tab Take 1 Tab by mouth daily. Please schedule an annual exam ASAP. 7/6/20   Diana Jain MD   escitalopram oxalate (LEXAPRO) 10 mg tablet Take 10 mg by mouth daily. 1/31/18   Provider, Historical   ibuprofen (MOTRIN) 800 mg tablet Take 1 tablet by mouth every eight (8) hours. Patient not taking: Reported on 4/29/2019 10/21/14   Ashu Mane DO        Review of Systems: History obtained from the patient  Constitutional: negative for weight loss, fever, night sweats  Breast: negative for breast lumps, nipple discharge, galactorrhea  GI: negative for change in bowel habits, abdominal pain, black or bloody stools  : negative for frequency, dysuria, hematuria, vaginal discharge  MSK: negative for back pain, joint pain, muscle pain  Skin: negative for itching, rash, hives  Psych: negative for anxiety, depression, change in mood      Objective:  Visit Vitals  Breastfeeding No       Physical Exam:   PHYSICAL EXAMINATION    Constitutional  · Appearance: well-nourished, well developed, alert, in no acute distress    Skin  · General Inspection: no rash, no lesions identified    Neurologic/Psychiatric  · Mental Status:  · Orientation: grossly oriented to person, place and time  · Mood and Affect: mood normal, affect appropriate    Assessment:   Missed AB      Plan:   Suction D & C, IC obtained. NFQ.

## 2021-03-04 ENCOUNTER — ANESTHESIA (OUTPATIENT)
Dept: SURGERY | Age: 31
End: 2021-03-04
Payer: COMMERCIAL

## 2021-03-04 ENCOUNTER — ANESTHESIA EVENT (OUTPATIENT)
Dept: SURGERY | Age: 31
End: 2021-03-04
Payer: COMMERCIAL

## 2021-03-04 ENCOUNTER — HOSPITAL ENCOUNTER (OUTPATIENT)
Age: 31
Setting detail: OUTPATIENT SURGERY
Discharge: HOME | End: 2021-03-04
Attending: OBSTETRICS & GYNECOLOGY | Admitting: OBSTETRICS & GYNECOLOGY
Payer: COMMERCIAL

## 2021-03-04 VITALS
HEART RATE: 71 BPM | TEMPERATURE: 98.2 F | WEIGHT: 213.63 LBS | RESPIRATION RATE: 14 BRPM | HEIGHT: 65 IN | DIASTOLIC BLOOD PRESSURE: 57 MMHG | SYSTOLIC BLOOD PRESSURE: 111 MMHG | OXYGEN SATURATION: 96 % | BODY MASS INDEX: 35.59 KG/M2

## 2021-03-04 DIAGNOSIS — O02.1 MISSED ABORTION: ICD-10-CM

## 2021-03-04 LAB
COVID-19 RAPID TEST, COVR: NOT DETECTED
ERYTHROCYTE [DISTWIDTH] IN BLOOD BY AUTOMATED COUNT: 11.9 % (ref 11.5–14.5)
HCT VFR BLD AUTO: 37.3 % (ref 35–47)
HGB BLD-MCNC: 12.9 G/DL (ref 11.5–16)
MCH RBC QN AUTO: 30.4 PG (ref 26–34)
MCHC RBC AUTO-ENTMCNC: 34.6 G/DL (ref 30–36.5)
MCV RBC AUTO: 87.8 FL (ref 80–99)
NRBC # BLD: 0 K/UL (ref 0–0.01)
NRBC BLD-RTO: 0 PER 100 WBC
PLATELET # BLD AUTO: 266 K/UL (ref 150–400)
PMV BLD AUTO: 9.7 FL (ref 8.9–12.9)
RBC # BLD AUTO: 4.25 M/UL (ref 3.8–5.2)
SARS-COV-2, COV2: NORMAL
SOURCE, COVRS: NORMAL
WBC # BLD AUTO: 6.6 K/UL (ref 3.6–11)

## 2021-03-04 PROCEDURE — 74011250636 HC RX REV CODE- 250/636: Performed by: ANESTHESIOLOGY

## 2021-03-04 PROCEDURE — 77030008578 HC TBNG UTER SUC BUSS -A: Performed by: OBSTETRICS & GYNECOLOGY

## 2021-03-04 PROCEDURE — 87635 SARS-COV-2 COVID-19 AMP PRB: CPT

## 2021-03-04 PROCEDURE — 76210000020 HC REC RM PH II FIRST 0.5 HR: Performed by: OBSTETRICS & GYNECOLOGY

## 2021-03-04 PROCEDURE — 76210000016 HC OR PH I REC 1 TO 1.5 HR: Performed by: OBSTETRICS & GYNECOLOGY

## 2021-03-04 PROCEDURE — 85027 COMPLETE CBC AUTOMATED: CPT

## 2021-03-04 PROCEDURE — 77030019905 HC CATH URETH INTMIT MDII -A: Performed by: OBSTETRICS & GYNECOLOGY

## 2021-03-04 PROCEDURE — 77030011210: Performed by: OBSTETRICS & GYNECOLOGY

## 2021-03-04 PROCEDURE — 74011250636 HC RX REV CODE- 250/636: Performed by: NURSE ANESTHETIST, CERTIFIED REGISTERED

## 2021-03-04 PROCEDURE — 2709999900 HC NON-CHARGEABLE SUPPLY: Performed by: OBSTETRICS & GYNECOLOGY

## 2021-03-04 PROCEDURE — 88305 TISSUE EXAM BY PATHOLOGIST: CPT

## 2021-03-04 PROCEDURE — 76060000032 HC ANESTHESIA 0.5 TO 1 HR: Performed by: OBSTETRICS & GYNECOLOGY

## 2021-03-04 PROCEDURE — 36415 COLL VENOUS BLD VENIPUNCTURE: CPT

## 2021-03-04 PROCEDURE — 77030020143 HC AIRWY LARYN INTUB CGAS -A: Performed by: ANESTHESIOLOGY

## 2021-03-04 PROCEDURE — 59820 CARE OF MISCARRIAGE: CPT | Performed by: OBSTETRICS & GYNECOLOGY

## 2021-03-04 PROCEDURE — 77030013079 HC BLNKT BAIR HGGR 3M -A: Performed by: ANESTHESIOLOGY

## 2021-03-04 PROCEDURE — 76010000138 HC OR TIME 0.5 TO 1 HR: Performed by: OBSTETRICS & GYNECOLOGY

## 2021-03-04 RX ORDER — PROPOFOL 10 MG/ML
INJECTION, EMULSION INTRAVENOUS AS NEEDED
Status: DISCONTINUED | OUTPATIENT
Start: 2021-03-04 | End: 2021-03-04 | Stop reason: HOSPADM

## 2021-03-04 RX ORDER — ONDANSETRON 2 MG/ML
INJECTION INTRAMUSCULAR; INTRAVENOUS AS NEEDED
Status: DISCONTINUED | OUTPATIENT
Start: 2021-03-04 | End: 2021-03-04 | Stop reason: HOSPADM

## 2021-03-04 RX ORDER — FENTANYL CITRATE 50 UG/ML
INJECTION, SOLUTION INTRAMUSCULAR; INTRAVENOUS AS NEEDED
Status: DISCONTINUED | OUTPATIENT
Start: 2021-03-04 | End: 2021-03-04 | Stop reason: HOSPADM

## 2021-03-04 RX ORDER — SODIUM CHLORIDE, SODIUM LACTATE, POTASSIUM CHLORIDE, CALCIUM CHLORIDE 600; 310; 30; 20 MG/100ML; MG/100ML; MG/100ML; MG/100ML
INJECTION, SOLUTION INTRAVENOUS
Status: DISCONTINUED | OUTPATIENT
Start: 2021-03-04 | End: 2021-03-04 | Stop reason: HOSPADM

## 2021-03-04 RX ORDER — HYDROMORPHONE HYDROCHLORIDE 2 MG/ML
INJECTION, SOLUTION INTRAMUSCULAR; INTRAVENOUS; SUBCUTANEOUS AS NEEDED
Status: DISCONTINUED | OUTPATIENT
Start: 2021-03-04 | End: 2021-03-04 | Stop reason: HOSPADM

## 2021-03-04 RX ORDER — MIDAZOLAM HYDROCHLORIDE 1 MG/ML
INJECTION, SOLUTION INTRAMUSCULAR; INTRAVENOUS AS NEEDED
Status: DISCONTINUED | OUTPATIENT
Start: 2021-03-04 | End: 2021-03-04 | Stop reason: HOSPADM

## 2021-03-04 RX ORDER — DEXAMETHASONE SODIUM PHOSPHATE 4 MG/ML
INJECTION, SOLUTION INTRA-ARTICULAR; INTRALESIONAL; INTRAMUSCULAR; INTRAVENOUS; SOFT TISSUE AS NEEDED
Status: DISCONTINUED | OUTPATIENT
Start: 2021-03-04 | End: 2021-03-04 | Stop reason: HOSPADM

## 2021-03-04 RX ADMIN — ONDANSETRON HYDROCHLORIDE 4 MG: 2 SOLUTION INTRAMUSCULAR; INTRAVENOUS at 11:32

## 2021-03-04 RX ADMIN — HYDROMORPHONE HYDROCHLORIDE 1 MG: 2 INJECTION INTRAMUSCULAR; INTRAVENOUS; SUBCUTANEOUS at 11:38

## 2021-03-04 RX ADMIN — DEXAMETHASONE SODIUM PHOSPHATE 8 MG: 4 INJECTION, SOLUTION INTRAMUSCULAR; INTRAVENOUS at 11:32

## 2021-03-04 RX ADMIN — MIDAZOLAM HYDROCHLORIDE 2 MG: 1 INJECTION, SOLUTION INTRAMUSCULAR; INTRAVENOUS at 11:14

## 2021-03-04 RX ADMIN — FENTANYL CITRATE 50 MCG: 0.05 INJECTION, SOLUTION INTRAMUSCULAR; INTRAVENOUS at 11:14

## 2021-03-04 RX ADMIN — MIDAZOLAM HYDROCHLORIDE 2 MG: 1 INJECTION, SOLUTION INTRAMUSCULAR; INTRAVENOUS at 11:17

## 2021-03-04 RX ADMIN — FENTANYL CITRATE 50 MCG: 0.05 INJECTION, SOLUTION INTRAMUSCULAR; INTRAVENOUS at 11:21

## 2021-03-04 RX ADMIN — PROPOFOL 200 MG: 10 INJECTION, EMULSION INTRAVENOUS at 11:21

## 2021-03-04 RX ADMIN — FAMOTIDINE 20 MG: 10 INJECTION, SOLUTION INTRAVENOUS at 10:13

## 2021-03-04 RX ADMIN — SODIUM CHLORIDE, POTASSIUM CHLORIDE, SODIUM LACTATE AND CALCIUM CHLORIDE: 600; 310; 30; 20 INJECTION, SOLUTION INTRAVENOUS at 11:05

## 2021-03-04 RX ADMIN — MIDAZOLAM HYDROCHLORIDE 1 MG: 1 INJECTION, SOLUTION INTRAMUSCULAR; INTRAVENOUS at 11:21

## 2021-03-04 NOTE — DISCHARGE INSTRUCTIONS
Patient Education        Vacuum Aspiration: Care Instructions  Your Care Instructions  Vacuum aspiration can be used to empty the uterus after a miscarriage or other fetal loss. Many miscarriages pass on their own, but some do not. These are called incomplete miscarriages and missed miscarriages. With an incomplete miscarriage, some of the pregnancy tissue stays in the uterus after a miscarriage. With a missed miscarriage, all of the tissue stays in the uterus after a miscarriage. You may have manual or machine vacuum aspiration. With manual vacuum, the doctor uses a specially designed syringe to apply suction. With machine vacuum, a thin tube is attached to a bottle and a pump. The tube is inserted into the uterus. The pump provides gentle suction to remove the tissue. After the procedure, you may have bleeding and spotting. You also may have cramps that feel like menstrual cramps. Guilt, anxiety, and sadness are common reactions after a miscarriage. It is also common to want to know why a miscarriage has happened. Hormonal changes during pregnancy can make emotions stronger than usual. These feelings can last a while. Follow-up care is a key part of your treatment and safety. Be sure to make and go to all appointments, and call your doctor if you are having problems. It's also a good idea to know your test results and keep a list of the medicines you take. How can you care for yourself at home? · If your doctor prescribed antibiotics, take them as directed. Do not stop taking them just because you feel better. You need to take the full course of antibiotics. · Rest quietly for the day. You can do normal activities the next day, based on how you feel. · Ask your doctor if you can take an over-the-counter pain medicine, such as acetaminophen (Tylenol), ibuprofen (Advil, Motrin), or naproxen (Aleve). Be safe with medicines. Read and follow all instructions on the label.   · Use sanitary pads until you stop bleeding. Using pads makes it easier to monitor your bleeding. It is normal to have mild or moderate vaginal bleeding for 1 to 2 weeks. It may be similar to or slightly heavier than a normal period. The bleeding should get lighter after a week. · Ask your doctor when it is okay to have sex. If you don't want to get pregnant, ask your doctor about birth control. You can get pregnant again before your next period starts if you aren't using birth control. If you plan to get pregnant again, check with your doctor. · If you and your family need help coping with your loss, consider meeting with a support group. You also may want to read about the experiences of other mothers. Or you can talk to friends, a counselor, or member of the clergy. If you feel very sad or depressed for longer than a couple of weeks, talk to a counselor or your doctor. When should you call for help? Call your doctor now or seek immediate medical care if:    · You have severe vaginal bleeding. This means that you are soaking through your usual pads each hour for 2 or more hours.     · You are dizzy or lightheaded, or you feel like you may faint.     · You have new or increased pain in your belly or pelvis.     · You have a fever.     · You feel depressed or are not able to function. Watch closely for changes in your health, and be sure to contact your doctor if:    · Your vaginal bleeding is getting worse.     · You have any new symptoms.     · You have vaginal discharge that smells bad.     · You do not get better as expected. Where can you learn more? Go to http://www.gray.com/  Enter F371 in the search box to learn more about \"Vacuum Aspiration: Care Instructions. \"  Current as of: February 11, 2020               Content Version: 12.6  © 9211-7438 [employer]. Care instructions adapted under license by [employer] (which disclaims liability or warranty for this information).  If you have questions about a medical condition or this instruction, always ask your healthcare professional. [place] [address] any warranty or liability for your use of this information.

## 2021-03-04 NOTE — H&P
Gynecology History and Physical    Name: Martita Angel MRN: [id number] SSN: [id number]    YOB: 1990  Age: 27 y.o. Sex: female       Subjective:      Chief complaint: non-viable pregnancy    Jorge Alonso is a 27 y.o.  female with a history of a non-viable pregnancy in the first trimester. Previous evaluation has been done with ultrasound and pelvic exam, which revealed no fetal heart tones and a significant amount of POC, putting her at risk for hemorrhage at miscarriage. She is now admitted for outpatient surgery consisting of Procedure(s) (LRB):  DILATATION AND CURETTAGE WITH SUCTION (URGENT) (N/A). OB History        3    Para   2    Term   2            AB   1    Living   2       SAB   1    TAB        Ectopic        Molar        Multiple        Live Births   2              Past Medical History:   Diagnosis Date    AR (allergic rhinitis)     Asthma     Carpal tunnel syndrome on both sides     HPV vaccine counseling     Gardasil 1/3    Routine Papanicolaou smear 18 neg     Past Surgical History:   Procedure Laterality Date    HX OTHER SURGICAL      dermoid cyst removed left eyebrow - age 3     Social History     Occupational History    Occupation: [employer]    Tobacco Use    Smoking status: Never Smoker    Smokeless tobacco: Never Used   Substance and Sexual Activity    Alcohol use: No    Drug use: No    Sexual activity: Yes     Partners: Male     Birth control/protection: Pill     Family History   Problem Relation Age of Onset    Cancer Mother         cervical CA    Hypertension Maternal Grandfather     Diabetes Maternal Grandfather     Atrial Fibrillation Maternal Grandfather     Prostate Cancer Maternal Grandfather     Stroke Maternal Grandmother     Hypertension Maternal Grandmother     High Cholesterol Maternal Grandmother         No Known Allergies  Prior to Admission medications    Medication Sig Start Date End Date Taking? Authorizing Provider   desogestreL-ethinyl estradioL (DESOGEN) 0.15-0.03 mg tab Take 1 Tab by mouth daily. Please schedule an annual exam ASAP. 7/6/20   Jovany Aldrich MD   ibuprofen (MOTRIN) 800 mg tablet Take 1 tablet by mouth every eight (8) hours.   Patient not taking: Reported on 4/29/2019 10/21/14   Julieta Putnam DO        Review of Systems:  History obtained from the patient-negative for:  Constitutional: weight loss, fever, night sweats  HEENT: hearing loss, earache, congestion, snoring, sorethroat  CV: chest pain, palpitations, edema  Resp: cough, shortness of breath, wheezing  Breast: breast lumps, nipple discharge, galactorrhea  GI: change in bowel habits, abdominal pain, black or bloody stools  : frequency, dysuria, hematuria, vaginal discharge  MSK: back pain, joint pain, muscle pain  Skin: itching, rash, hives  Neuro: dizziness, headache, confusion, weakness  Psych: anxiety, depression, change in mood  Heme/lymph: bleeding, bruising, pallor         Objective:     Vitals:    03/04/21 0845 03/04/21 0901   BP:  131/72   Pulse:  75   Resp:  18   Temp:  99.2 °F (37.3 °C)   SpO2:  100%   Weight: 213 lb 10 oz (96.9 kg)    Height: 5' 5\" (1.651 m)        PHYSICAL EXAMINATION    Constitutional  · Appearance: well-nourished, well developed, alert, in no acute distress    HENT  · Head and Face: appears normal    Neck  · Inspection/Palpation: normal appearance, no masses or tenderness  · Lymph Nodes: no lymphadenopathy present  · Thyroid: gland size normal, nontender, no nodules or masses present on palpation    Chest  · Respiratory Effort: breathing labored  · Auscultation: normal breath sounds    Cardiovascular  · Heart:  · Auscultation: regular rate and rhythm without murmur    Breasts  · Inspection of Breasts: breasts symmetrical, no skin changes, no discharge present, nipple appearance normal, no skin retraction present  · Palpation of Breasts and Axillae: no masses present on palpation, no breast tenderness  · Axillary Lymph Nodes: no lymphadenopathy present    Gastrointestinal  · Abdominal Examination: abdomen non-tender to palpation, normal bowel sounds, no masses present  · Liver and spleen: no hepatomegaly present, spleen not palpable  · Hernias: no hernias identified    Genitourinary  · External Genitalia: normal appearance for age, no discharge present, no tenderness present, no inflammatory lesions present, no masses present, no atrophy present  · Vagina: normal vaginal vault with no discharge present, no inflammatory lesions present, no masses present  · Bladder: non-tender to palpation  · Urethra: appears normal  · Cervix: normal   · Uterus: enlarged, soft, consistent with pregnancy  · Adnexa: no adnexal tenderness present, no adnexal masses present  · Perineum: perineum within normal limits, no evidence of trauma, no rashes or skin lesions present  · Anus: anus within normal limits, no hemorrhoids present  · Inguinal Lymph Nodes: no lymphadenopathy present    Skin  · General Inspection: no rash, no lesions identified    Neurologic/Psychiatric  · Mental Status:  · Orientation: grossly oriented to person, place and time  · Mood and Affect: mood normal, affect appropriate            Assessment:   Non-viable first trimester pregnancy with significant POC.    Plan:     Procedure(s) (LRB):  DILATATION AND CURETTAGE WITH SUCTION (URGENT) (N/A)  Discussed the risks of surgery including the risks of bleeding, infection, deep vein thrombosis, and surgical injuries to internal organs including but not limited to the bowels, bladder, rectum, and female reproductive organs. The patient understands the risks; any and all questions were answered to the patient's satisfaction.    Signed By:  Bowen Gomez MD     March 4, 2021

## 2021-03-04 NOTE — PERIOP NOTES
Discharge instructions reviewed with pt's  Mariela Waters. Opportunity for questions provided and answered.

## 2021-03-04 NOTE — ANESTHESIA PREPROCEDURE EVALUATION
Anesthetic History   No history of anesthetic complications            Review of Systems / Medical History  Patient summary reviewed and pertinent labs reviewed    Pulmonary            Asthma : well controlled       Neuro/Psych   Within defined limits           Cardiovascular  Within defined limits                     GI/Hepatic/Renal  Within defined limits              Endo/Other        Obesity     Other Findings   Comments: Missed AB, denies bleeding         Physical Exam    Airway  Mallampati: II    Neck ROM: normal range of motion   Mouth opening: Normal     Cardiovascular    Rhythm: regular  Rate: normal         Dental    Dentition: Lower dentition intact and Upper dentition intact     Pulmonary  Breath sounds clear to auscultation               Abdominal  GI exam deferred       Other Findings            Anesthetic Plan    ASA: 2  Anesthesia type: general  preop Pepcid        Induction: Intravenous  Anesthetic plan and risks discussed with: Patient

## 2021-03-04 NOTE — OP NOTES
[facility]     NAME: Juan Calabrese    AGE: 27 y.o. YOB: 1990    MEDICAL RECORD NUMBER: [id number]    DATE OF SURGERY: 3/4/2021    PREOPERATIVE DIAGNOSIS: MISSED AB     POSTOPERATIVE DIAGNOSIS: MISSED AB    PROCEDURE: Procedure(s):  DILATATION AND CURETTAGE WITH SUCTION (URGENT)     SURGEON:  Mustapha Mejias MD    ASSISTANT: staff    ANESTHESIA:general    EBL: 200 ml    SPECIMENS: Products of conception    Implants:  None    Complications: None    FINDINGS: Products of conception    DESCRIPTION OF PROCEDURE: The patient was placed on the operating room table in the dorsal lithotomy position after the induction of general endotracheal anesthesia. Time out was done to confirm the operating procedure, surgeon, patient and site. Once confirmed by the team, procedure was started. PROCEDURE: She was prepped and draped. Cervix was exposed with a weighted vaginal speculum and grasped with a two Allis clamps. The uterine cavity sounded to 12 cms. A curved 9 suction curette was then introduced into the endometrial cavity. Thorough suction curettage was followed by sharp curettage with a large curette. Suction curettage was repeated until the suction returned no further clot or products of conception. Adequate curettage was felt to be obtained. The products of conception were removed and sent to pathology. There was no sign of perforation. The uterus was massaged and hemostasis was adequate. Instruments were removed. The patient went to the recovery room in satisfactory condition. All counts were correct times two. Patient's blood type is documented in lab results.     Signed By:  Mustapha Mejias MD     March 4, 2021

## 2021-03-04 NOTE — ANESTHESIA POSTPROCEDURE EVALUATION
Procedure(s):  DILATATION AND CURETTAGE WITH SUCTION (URGENT). general    Anesthesia Post Evaluation      Multimodal analgesia: multimodal analgesia not used between 6 hours prior to anesthesia start to PACU discharge  Patient location during evaluation: PACU  Patient participation: complete - patient participated  Level of consciousness: awake  Pain management: adequate  Airway patency: patent  Anesthetic complications: no  Cardiovascular status: acceptable, blood pressure returned to baseline and hemodynamically stable  Respiratory status: acceptable  Hydration status: acceptable  Post anesthesia nausea and vomiting:  controlled  Final Post Anesthesia Temperature Assessment:  Normothermia (36.0-37.5 degrees C)      INITIAL Post-op Vital signs:   Vitals Value Taken Time   /61 03/04/21 1255   Temp 36.8 °C (98.2 °F) 03/04/21 1240   Pulse 71 03/04/21 1300   Resp 14 03/04/21 1300   SpO2 96 % 03/04/21 1300   Vitals shown include unvalidated device data.

## 2021-03-19 ENCOUNTER — OFFICE VISIT (OUTPATIENT)
Dept: OBGYN CLINIC | Age: 31
End: 2021-03-19
Payer: COMMERCIAL

## 2021-03-19 VITALS
HEIGHT: 65 IN | BODY MASS INDEX: 35.49 KG/M2 | DIASTOLIC BLOOD PRESSURE: 72 MMHG | WEIGHT: 213 LBS | SYSTOLIC BLOOD PRESSURE: 132 MMHG

## 2021-03-19 DIAGNOSIS — Z09 POSTOP CHECK: Primary | ICD-10-CM

## 2021-03-19 PROCEDURE — 99024 POSTOP FOLLOW-UP VISIT: CPT | Performed by: OBSTETRICS & GYNECOLOGY

## 2021-03-19 NOTE — PROGRESS NOTES
Postop Evaluation  Clemencia Kearns is a 27 y.o. female returns for a routine post-operative follow-up visit after undergoing the following: D&C which was done 2 weeks ago. She is still having occasional spotting. Her pathology results revealed    FINAL PATHOLOGIC DIAGNOSIS   Products of conception; dilation and curettage:   Products of conception including chorionic villi with hydropic change and gestational type endometrium. Since the patient's surgery, she has had typical postoperative discomfort but no significant symptoms or problems since the surgery. The patient's incision is healing well with no significant drainage. She states since the procedure, she has returned to full daily activities, ambulating, and not lifting or exercising. PHYSICAL EXAMINATION    Gastrointestinal  · Abdominal Examination: abdomen non-tender to palpation, incision/s healing well, normal bowel sounds, no masses present  · Liver and spleen: no hepatomegaly present, spleen not palpable  · Hernias: no hernias identified    Genitourinary  · External Genitalia: normal appearance for age, no discharge present, no tenderness present, no inflammatory lesions present, no masses present, no atrophy present  · Vagina: normal vaginal vault without central or paravaginal defects, no discharge present, no inflammatory lesions present, no masses present  · Bladder: non-tender to palpation  · Urethra: appears normal  · Cervix: normal   · Uterus: normal size, shape and consistency  · Adnexa: no adnexal tenderness present, no adnexal masses present  · Perineum: perineum within normal limits, no evidence of trauma, no rashes or skin lesions present  Skin  · General Inspection: no rash, no lesions identified    Neurologic/Psychiatric  · Mental Status:  · Orientation: grossly oriented to person, place and time  · Mood and Affect: mood normal, affect appropriate    Assessment:  Normal postop checkup    Plan:  RTO as scheduled for AE.

## 2021-03-19 NOTE — PATIENT INSTRUCTIONS
Dilation and Curettage: What to Expect at Home  Your Recovery  Dilation and curettage (D&C) is a procedure to remove tissue from the inside of the uterus. The doctor used a curved tool, called a curette, to gently scrape tissue from your uterus. You are likely to have a backache, or cramps similar to menstrual cramps, and pass small clots of blood from your vagina for the first few days. You may have light vaginal bleeding for several weeks after the procedure. You will probably be able to go back to most of your normal activities in 1 or 2 days. This care sheet gives you a general idea about how long it will take for you to recover. But each person recovers at a different pace. Follow the steps below to get better as quickly as possible. How can you care for yourself at home? Activity    · Rest when you feel tired. Getting enough sleep will help you recover.     · Most women are able to return to work the day after the procedure.     · You may have some light vaginal bleeding. Use sanitary pads until you stop bleeding. Using pads makes it easier to monitor your bleeding. Do not rinse your vagina with fluid (douche).   · Ask your doctor when it is okay for you to have sex. Diet    · You can eat your normal diet. If your stomach is upset, try bland, low-fat foods like plain rice, broiled chicken, toast, and yogurt.     · Drink plenty of fluids (unless your doctor tells you not to). Medicines    · Your doctor will tell you if and when you can restart your medicines. You will also get instructions about taking any new medicines.     · If you take aspirin or some other blood thinner, ask your doctor if and when to start taking it again. Make sure that you understand exactly what your doctor wants you to do.     · Be safe with medicines. Take pain medicines exactly as directed. ? If the doctor gave you a prescription medicine for pain, take it as prescribed.   ? If you are not taking a prescription pain medicine, ask your doctor if you can take an over-the-counter medicine.     · If you think your pain medicine is making you sick to your stomach:  ? Take your medicine after meals (unless your doctor has told you not to). ? Ask your doctor for a different pain medicine.     · If your doctor prescribed antibiotics, take them as directed. Do not stop taking them just because you feel better. You need to take the full course of antibiotics. Follow-up care is a key part of your treatment and safety. Be sure to make and go to all appointments, and call your doctor if you are having problems. It's also a good idea to know your test results and keep a list of the medicines you take. When should you call for help? Call 911 anytime you think you may need emergency care. For example, call if:    · You passed out (lost consciousness).     · You have chest pain, are short of breath, or cough up blood. Call your doctor now or seek immediate medical care if:    · You have bright red vaginal bleeding that soaks one or more pads in an hour, or you have large clots.     · You have vaginal discharge that increases in amount or smells bad.     · You are sick to your stomach or cannot drink fluids.     · You have pain that does not get better after you take pain medicine.     · You cannot pass stools or gas.     · You have symptoms of a blood clot in your leg (called a deep vein thrombosis), such as:  ? Pain in your calf, back of the knee, thigh, or groin. ? Redness and swelling in your leg.     · You have signs of infection, such as:  ? Increased pain, swelling, warmth, or redness. ? Red streaks leading from the area. ? Pus draining from the area. ? A fever. Watch closely for changes in your health, and be sure to contact your doctor if you have any problems. Where can you learn more?   Go to http://[email or web address].org.com/  Enter D453 in the search box to learn more about \"Dilation and Curettage: What to Expect at Home. \"  Current as of: February 11, 2020               Content Version: 12.6  © 6012-9686 [employer]. Care instructions adapted under license by [employer] (which disclaims liability or warranty for this information). If you have questions about a medical condition or this instruction, always ask your healthcare professional. [place] [address] any warranty or liability for your use of this information.

## 2021-08-16 ENCOUNTER — TELEPHONE (OUTPATIENT)
Dept: OBGYN CLINIC | Age: 31
End: 2021-08-16

## 2021-08-16 NOTE — TELEPHONE ENCOUNTER
Pt reports that she is 6wks and 2 days pregnant and has her first prenatal appointment on 08/27/21. She reports that she just tested positive for COVID-19. She wants to know what she should do at this point. The patient was advised to quarantine and stay hydrated. Patient verbalized understanding.

## 2021-08-31 NOTE — PROGRESS NOTES
Current pregnancy history:    Dilma Quiroz is a 32 y.o. female who presents for the evaluation of pregnancy. Patient's last menstrual period was 2021. LMP history:  The date of her LMP is  certain. Her last menstrual period was normal and lasted for 4 to 5 days. A urine pregnancy test was positive around 21. She was not on the pill at conception. Based on her LMP, her EDC is 2022 and her EGA is 8 weeks, 6 days. Her menstrual cycles are regular and occur approximately every 28 days  and range from 3 to 5 days. The last menses lasted  the usual number of days. Ultrasound data:  She had an  ultrasound done by the physician today which revealed a viable nava pregnancy with a gestational age of 9 weeks and 6 days giving an [address] of 23. Pregnancy symptoms:    Since her LMP she has experienced  urinary frequency, breast tenderness, and nausea. She has not been vomiting over the last few weeks. Associated signs and symptoms which she denies: dysuria, discharge, vaginal bleeding. She states she has  gained weight:  Approximately a few pounds over the last few weeks. Relevant past pregnancy history:   She has the following pregnancy history: SAB in february    She has no history of  delivery. Relevant past medical history:(relevant to this pregnancy): noncontributory. Pap/Occupational history:  Last pap smear: 2021 Results: Normal        Substance history: negative for alcohol, tobacco and street drugs. Positive for nothing. Exposure history: There is/are no indoor cat/s in the home. The patient was instructed to not change the cat litter. She admits close contact with children on a regular basis. She has had chicken pox or the vaccine in the past.   Patient denies issues with domestic violence.      Genetic Screening/Teratology Counseling: (Includes patient, baby's father, or anyone in either family with:)  3.  Patient's age >/= 28 at EDC?-- no  .   2. Thalassemia ([facility], [facility], [address], or  background): MCV<80?--no.     3.  Neural tube defect (meningomyelocele, spina bifida, anencephaly)?--no.   4.  Congenital heart defect?--no.  5.  Down syndrome?--no.   6.  Woody-Sachs ([employer], [place])?--no.   7.  Canavan's Disease?--no.   8.  Familial Dysautonomia?--no.   9.  Sickle cell disease or trait ()? --no   The patient has not been tested for sickle trait  10. Hemophilia or other blood disorders?--no. 11.  Muscular dystrophy?--no. 12.  Cystic fibrosis?--no. 13.  Antonietta's Chorea?--no. 14.  Mental retardation/autism (if yes was person tested for Fragile X)?--no. 15.  Other inherited genetic or chromosomal disorder?--no. 12.  Maternal metabolic disorder (DM, PKU, etc)?--no. 17.  Patient or FOB with a child with a birth defect not listed above?--no.  17a. Patient or FOB with a birth defect themselves?--no. 18.  Recurrent pregnancy loss, or stillbirth?--no. 19.  Any medications since LMP other than prenatal vitamins (include vitamins, supplements, OTC meds, drugs, alcohol)?--no. 20.  Any other genetic/environmental exposure to discuss?--no. Infection History:  1. Lives with someone with TB or TB exposed?--no.   2.  Patient or partner has history of genital herpes?--no.  3.  Rash or viral illness since LMP?--no.    4.  History of STD (GC, CT, HPV, syphilis, HIV)? --no   5. Other: OTHER?       Past Medical History:   Diagnosis Date    AR (allergic rhinitis)     Asthma     Carpal tunnel syndrome on both sides     HPV vaccine counseling     Gardasil 1/3    Routine Papanicolaou smear 2/22/18 neg     Past Surgical History:   Procedure Laterality Date    HX DILATION AND CURETTAGE  03/04/2021    missed AB    HX OTHER SURGICAL      dermoid cyst removed left eyebrow - age 3     Social History     Occupational History    Occupation: [employer]    Tobacco Use    Smoking status: Never Smoker    Smokeless tobacco: Never Used   Substance and Sexual Activity    Alcohol use: No    Drug use: No    Sexual activity: Yes     Partners: Male     Birth control/protection: None     Family History   Problem Relation Age of Onset    Cancer Mother         cervical CA    Hypertension Maternal Grandfather     Diabetes Maternal Grandfather     Atrial Fibrillation Maternal Grandfather     Prostate Cancer Maternal Grandfather     Stroke Maternal Grandmother     Hypertension Maternal Grandmother     High Cholesterol Maternal Grandmother        No Known Allergies  Prior to Admission medications    Medication Sig Start Date End Date Taking? Authorizing Provider   ibuprofen (MOTRIN) 800 mg tablet Take 1 tablet by mouth every eight (8) hours.   Patient not taking: Reported on 4/29/2019 10/21/14   Candace Davenport,         Review of Systems: History obtained from the patient  Constitutional: negative for weight loss, fever, night sweats  HEENT: negative for hearing loss, earache, congestion, snoring, sorethroat  CV: negative for chest pain, palpitations, edema  Resp: negative for cough, shortness of breath, wheezing  Breast: negative for breast lumps, nipple discharge, galactorrhea  GI: negative for change in bowel habits, abdominal pain, black or bloody stools  : negative for frequency, dysuria, hematuria, vaginal discharge  MSK: negative for back pain, joint pain, muscle pain  Skin: negative for itching, rash, hives  Neuro: negative for dizziness, headache, confusion, weakness  Psych: negative for anxiety, depression, change in mood  Heme/lymph: negative for bleeding, bruising, pallor    Objective:  Visit Vitals  /60   Wt 218 lb (98.9 kg)   LMP 07/03/2021   Breastfeeding No   BMI 36.28 kg/m²       Physical Exam:   PHYSICAL EXAMINATION    Constitutional  · Appearance: well-nourished, well developed, alert, in no acute distress    HENT  · Head  · Face: appears normal  · Eyes: appear normal  · Ears: normal  · Mouth: normal  · Lips: no lesions    Neck  · Inspection/Palpation: normal appearance, no masses or tenderness  · Lymph Nodes: no lymphadenopathy present  · Thyroid: gland size normal, nontender, no nodules or masses present on palpation    Chest  · Respiratory Effort: breathing unlabored  · Auscultation: normal breath sounds    Cardiovascular  · Heart:  · Auscultation: regular rate and rhythm without murmur    Breasts  · Inspection of Breasts: breasts symmetrical, no skin changes, no discharge present, nipple appearance normal, no skin retraction present  · Palpation of Breasts and Axillae: no masses present on palpation, no breast tenderness  · Axillary Lymph Nodes: no lymphadenopathy present    Gastrointestinal  · Abdominal Examination: abdomen non-tender to palpation, normal bowel sounds, no masses present  · Liver and spleen: no hepatomegaly present, spleen not palpable  · Hernias: no hernias identified    Genitourinary  · External Genitalia: normal appearance for age, no discharge present, no tenderness present, no inflammatory lesions present, no masses present, no atrophy present  · Vagina: normal vaginal vault without central or paravaginal defects, no discharge present, no inflammatory lesions present, no masses present  · Bladder: non-tender to palpation  · Urethra: appears normal  · Cervix: normal   · Uterus: enlarged, normal shape, soft  · Adnexa: no adnexal tenderness present, no adnexal masses present  · Perineum: perineum within normal limits, no evidence of trauma, no rashes or skin lesions present  · Anus: anus within normal limits, no hemorrhoids present  · Inguinal Lymph Nodes: no lymphadenopathy present    Skin  · General Inspection: no rash, no lesions identified    Neurologic/Psychiatric  · Mental Status:  · Orientation: grossly oriented to person, place and time  · Mood and Affect: mood normal, affect appropriate    Assessment:   Intrauterine pregnancy with the following problems identified: recent SAB. Plan:     Offered CF testing, CVS, Nuchal Translucency, MSAFP, amnio, and discussed NIPT  Course of pregnancy discussed including visit schedule, routine U/S, glucola testing, etc.  Avoid alcoholic beverages and illicit/recreational drugs use  Take prenatal vitamins or folic acid daily. Hospital and practice style discussed with coverage system. Discussed nutrition, toxoplasmosis precautions, sexual activity, exercise, need for influenza vaccine, environmental and work hazards, travel advice, screen for domestic violence, need for seat belts. Discussed seafood, unpasteurized dairy products, deli meat, artificial sweeteners, and caffeine. Information on prenatal classes/breastfeeding given. Information on circumcision given  Patient encouraged not to smoke. Discussed current prescription drug use. Given medication list.  Discussed the use of over the counter medications and chemicals. Route of delivery discussed, including risks, benefits, and alternatives of  versus repeat LTCS. Pt understands risk of hemorrhage during pregnancy and post delivery and would accept blood products if necessary in life-threatening emergencies      Handouts given to pt. Transvaginal First Trimester Ultrasound Procedure    Jacquelin King is a ,  32 y.o. female [address] [facility] [place] Patient's last menstrual period was 2021. This makes her currently 8 weeks and 6 days of gestation by dates. She is here today for early pregnancy ultrasound for pregnancy dating. Ultrasound results:   A nava intrauterine pregnancy with visible cardiac activity is seen. The yolk sac is visible and measures approximately 0.46 cm in diameter.   The crown rump length of the fetus is measurable at 1.36 cm, consistent with 7 weeks and 6 days  Subchorionic hemorrhage was not seen  Right Ovary - Not well seen   Left Ovary - Not well seen  Comment:

## 2021-09-03 ENCOUNTER — ROUTINE PRENATAL (OUTPATIENT)
Dept: OBGYN CLINIC | Age: 31
End: 2021-09-03
Payer: COMMERCIAL

## 2021-09-03 VITALS — SYSTOLIC BLOOD PRESSURE: 104 MMHG | DIASTOLIC BLOOD PRESSURE: 60 MMHG | WEIGHT: 218 LBS | BODY MASS INDEX: 36.28 KG/M2

## 2021-09-03 DIAGNOSIS — N92.6 MISSED MENSES: ICD-10-CM

## 2021-09-03 DIAGNOSIS — Z34.80 ENCOUNTER FOR SUPERVISION OF OTHER NORMAL PREGNANCY, UNSPECIFIED TRIMESTER: ICD-10-CM

## 2021-09-03 DIAGNOSIS — Z32.01 PREGNANCY EXAMINATION OR TEST, POSITIVE RESULT: Primary | ICD-10-CM

## 2021-09-03 PROCEDURE — 76817 TRANSVAGINAL US OBSTETRIC: CPT | Performed by: OBSTETRICS & GYNECOLOGY

## 2021-09-03 PROCEDURE — 0502F SUBSEQUENT PRENATAL CARE: CPT | Performed by: OBSTETRICS & GYNECOLOGY

## 2021-09-03 NOTE — PATIENT INSTRUCTIONS
Weeks 6 to 10 of Your Pregnancy: Care Instructions  Overview     During the first 6 to 10 weeks of your pregnancy, your body goes through many changes. Your baby grows very quickly, even though you can't feel it yet. You may start to feel different, both in your body and your emotions. Because each pregnancy is unique, there's no right way to feel. You may feel the healthiest you've ever been, or you might feel tired or sick to your stomach (\"morning sickness\"). These early weeks are a time to make healthy choices and to eat the best foods for you and your baby. This is also a good time to think about birth defects testing. These are tests done during pregnancy to look for possible problems with the baby. First-trimester tests for birth defects can be done between 8 and 13 weeks of pregnancy, depending on the test. Talk with your doctor about what kinds of tests are available. Follow-up care is a key part of your treatment and safety. Be sure to make and go to all appointments, and call your doctor if you are having problems. It's also a good idea to know your test results and keep a list of the medicines you take. How can you care for yourself at home? Eat well  · Eat at least 3 meals and 2 healthy snacks every day. Eat fresh, whole foods, including:  ? 7 or more servings of bread, tortillas, cereal, rice, pasta, or oatmeal.  ? 3 or more servings of vegetables, especially leafy green vegetables. ? 2 or more servings of fruits. ? 3 or more servings of milk, yogurt, or cheese. ? 2 or more servings of meat, turkey, chicken, fish, eggs, or dried beans. · Drink plenty of fluids, especially water. Avoid sodas and other sweetened drinks. · Choose foods that have important vitamins for your baby, such as calcium, iron, and folate. ? Dairy products, tofu, canned fish with bones, almonds, broccoli, dark leafy greens, corn tortillas, and fortified orange juice are good sources of calcium. ?  Beef, poultry, liver, spinach, lentils, dried beans, fortified cereals, and dried fruits are rich in iron. ? Dark leafy greens, broccoli, asparagus, liver, fortified cereals, orange juice, peanuts, and almonds are good sources of folate. · Avoid foods that could harm your baby. ? Do not eat raw or undercooked meat, chicken, or fish (such as sushi or raw oysters). ? Do not eat raw eggs or foods that contain raw eggs, such as Caesar dressing. ? Do not eat soft cheeses and unpasteurized dairy foods, such as Brie, feta, or blue cheese. ? Do not eat fish that contains a lot of mercury, such as shark, swordfish, tilefish, or bull mackerel. Do not eat more than 6 ounces of tuna each week. ? Do not eat raw sprouts, especially alfalfa sprouts. ? Cut down on caffeine, such as coffee, tea, and cola. Protect yourself and your baby  · Do not touch charline litter or cat feces. They can cause an infection that could harm your baby. · High body temperature can be harmful to your baby. So if you want to use a sauna or hot tub, be sure to talk to your doctor about how to use it safely. [place] with morning sickness  · Sip small amounts of water, juices, or shakes. Try drinking between meals, not with meals. · Eat 5 or 6 small meals a day. Try dry toast or crackers when you first get up, and eat breakfast a little later. · Avoid spicy, greasy, and fatty foods. · When you feel sick, open your windows or go for a short walk to get fresh air. · Try nausea wristbands. These help some women. · Tell your doctor if you think your prenatal vitamins make you sick. Where can you learn more? Go to http://www.gray.com/  Enter G112 in the search box to learn more about \"Weeks 6 to 10 of Your Pregnancy: Care Instructions. \"  Current as of: October 8, 2020               Content Version: 12.8  © 2863-0632 [employer].    Care instructions adapted under license by [employer] (which disclaims liability or warranty for this information). If you have questions about a medical condition or this instruction, always ask your healthcare professional. [place] [address] any warranty or liability for your use of this information.

## 2021-09-06 LAB
C TRACH RRNA SPEC QL NAA+PROBE: NEGATIVE
N GONORRHOEA RRNA SPEC QL NAA+PROBE: NEGATIVE
T VAGINALIS DNA SPEC QL NAA+PROBE: NEGATIVE

## 2021-10-01 ENCOUNTER — ROUTINE PRENATAL (OUTPATIENT)
Dept: OBGYN CLINIC | Age: 31
End: 2021-10-01
Payer: COMMERCIAL

## 2021-10-01 VITALS — SYSTOLIC BLOOD PRESSURE: 124 MMHG | BODY MASS INDEX: 36.28 KG/M2 | DIASTOLIC BLOOD PRESSURE: 62 MMHG | WEIGHT: 218 LBS

## 2021-10-01 DIAGNOSIS — Z34.80 ENCOUNTER FOR SUPERVISION OF OTHER NORMAL PREGNANCY, UNSPECIFIED TRIMESTER: ICD-10-CM

## 2021-10-01 DIAGNOSIS — Z34.81 ENCOUNTER FOR SUPERVISION OF OTHER NORMAL PREGNANCY IN FIRST TRIMESTER: Primary | ICD-10-CM

## 2021-10-01 LAB
ABO + RH BLD: NORMAL
ANTIBODY SCREEN, EXTERNAL: NEGATIVE
BLOOD BANK CMNT PATIENT-IMP: NORMAL
BLOOD GROUP ANTIBODIES SERPL: NORMAL
ERYTHROCYTE [DISTWIDTH] IN BLOOD BY AUTOMATED COUNT: 12.9 % (ref 11.5–14.5)
HBSAG, EXTERNAL: NEGATIVE
HBV SURFACE AG SER QL: 0.17 INDEX
HBV SURFACE AG SER QL: NEGATIVE
HCT VFR BLD AUTO: 36.4 % (ref 35–47)
HGB BLD-MCNC: 11.9 G/DL (ref 11.5–16)
HIV 1+2 AB+HIV1 P24 AG SERPL QL IA: NONREACTIVE
HIV, EXTERNAL: NONREACTIVE
HIV12 RESULT COMMENT, HHIVC: NORMAL
MCH RBC QN AUTO: 30.1 PG (ref 26–34)
MCHC RBC AUTO-ENTMCNC: 32.7 G/DL (ref 30–36.5)
MCV RBC AUTO: 92.2 FL (ref 80–99)
NRBC # BLD: 0 K/UL (ref 0–0.01)
NRBC BLD-RTO: 0 PER 100 WBC
PLATELET # BLD AUTO: 236 K/UL (ref 150–400)
PMV BLD AUTO: 10.9 FL (ref 8.9–12.9)
RBC # BLD AUTO: 3.95 M/UL (ref 3.8–5.2)
RPR SER QL: NONREACTIVE
RPR, EXTERNAL: NON REACTIVE
RUBELLA, EXTERNAL: NORMAL
RUBV IGG SER-IMP: REACTIVE
RUBV IGG SERPL IA-ACNC: 53 IU/ML
SPECIMEN EXP DATE BLD: NORMAL
WBC # BLD AUTO: 7.7 K/UL (ref 3.6–11)

## 2021-10-01 PROCEDURE — 0502F SUBSEQUENT PRENATAL CARE: CPT | Performed by: OBSTETRICS & GYNECOLOGY

## 2021-10-01 RX ORDER — LORATADINE 10 MG/1
10 TABLET ORAL
COMMUNITY
End: 2022-02-18

## 2021-10-01 NOTE — PATIENT INSTRUCTIONS
Weeks 10 to 14 of Your Pregnancy: Care Instructions  Overview     By weeks 10 to 14 of your pregnancy, the placenta has formed inside your uterus. The placenta's main job is to give your baby oxygen and nutrients through the umbilical cord. It's possible to hear your baby's heartbeat with a special ultrasound device. Your baby's organs are developing. The arms and legs can bend. This is a good time to think about testing for birth defects. There are two types of tests: screening and diagnostic. Screening tests show the chance that a baby has a certain birth defect. They can't tell you for sure that your baby has a problem. Diagnostic tests show if a baby has a certain birth defect. It's your choice whether to have these tests. You and your partner can talk to your doctor or midwife about tests for birth defects. Follow-up care is a key part of your treatment and safety. Be sure to make and go to all appointments, and call your doctor if you are having problems. It's also a good idea to know your test results and keep a list of the medicines you take. How can you care for yourself at home? Decide about tests  · You can have screening tests and diagnostic tests to check for birth defects. The decision to have a test for birth defects is personal. Think about your age, your chance of passing on a family disease, your need to know about any problems, and what you might do after you have the test results. ? Quadruple (quad) blood test. This screening test can be done between 15 and 22 weeks of pregnancy. It checks the amount of four substances in your blood. The doctor looks at these test results, along with your age and other factors, to find out the chance that your baby may have certain problems. ? Amniocentesis. This diagnostic test is used to look for chromosomal problems in the baby's cells.  It can be done between 15 and 20 weeks of pregnancy, usually around week 16.  ? Nuchal translucency test. This test uses ultrasound to measure the thickness of the area at the back of the baby's neck. An increase in the thickness can be an early sign of Down syndrome. ? Chorionic villus sampling (CVS). This is a test that looks for certain genetic problems with your baby. The same genes that are in your baby are in the placenta. A small piece of the placenta is taken out and tested. This test is done when you are 10 to 13 weeks pregnant. Ease discomfort  · Slow down and take naps when you feel tired. · If your emotions swing, talk to someone. · If your gums bleed, try a softer toothbrush. If your gums are puffy and bleed a lot, see your dentist.  · If you feel dizzy:  ? Get up slowly after sitting or lying down. ? Drink plenty of fluids. ? Eat small snacks to keep your blood sugar stable. ? Put your head between your legs as though you were tying your shoelaces. ? Lie down with your legs higher than your head. Use pillows to prop up your feet. · If you have a headache:  ? Lie down. ? Ask your partner or a good friend for a neck massage. ? Try cool cloths over your forehead or across the back of your neck. ? Use acetaminophen (Tylenol) for pain relief. Do not use nonsteroidal anti-inflammatory drugs (NSAIDs), such as ibuprofen (Advil, Motrin) or naproxen (Aleve), unless your doctor says it is okay. · If you have a nosebleed, pinch your nose gently, and hold it for a short while. To prevent nosebleeds, try massaging a small dab of petroleum jelly, such as Vaseline, in your nostrils. · If your nose is stuffed up, try saline (saltwater) nose sprays. Do not use decongestant sprays. Care for your breasts  · Wear a bra that gives you good support. · Know that changes in your breasts are normal.  ? Your breasts may get larger and more tender. Tenderness usually gets better by 12 weeks. ? Your nipples may get darker and larger, and small bumps around your nipples may show more. ?  The veins in your chest and breasts may show more. Where can you learn more? Go to http://www.gray.com/  Enter I341 in the search box to learn more about \"Weeks 10 to 14 of Your Pregnancy: Care Instructions. \"  Current as of: June 16, 2021               Content Version: 13.0  © 3278-7742 Healthwise, Incorporated. Care instructions adapted under license by [employer] (which disclaims liability or warranty for this information). If you have questions about a medical condition or this instruction, always ask your healthcare professional. [address] any warranty or liability for your use of this information.

## 2021-10-29 ENCOUNTER — ROUTINE PRENATAL (OUTPATIENT)
Dept: OBGYN CLINIC | Age: 31
End: 2021-10-29
Payer: COMMERCIAL

## 2021-10-29 VITALS — DIASTOLIC BLOOD PRESSURE: 72 MMHG | WEIGHT: 222 LBS | SYSTOLIC BLOOD PRESSURE: 128 MMHG | BODY MASS INDEX: 36.94 KG/M2

## 2021-10-29 DIAGNOSIS — Z34.80 ENCOUNTER FOR SUPERVISION OF OTHER NORMAL PREGNANCY, UNSPECIFIED TRIMESTER: ICD-10-CM

## 2021-10-29 DIAGNOSIS — Z34.82 ENCOUNTER FOR SUPERVISION OF OTHER NORMAL PREGNANCY IN SECOND TRIMESTER: Primary | ICD-10-CM

## 2021-10-29 LAB — AFP, MATERNAL, EXTERNAL: NEGATIVE

## 2021-10-29 PROCEDURE — 0502F SUBSEQUENT PRENATAL CARE: CPT | Performed by: OBSTETRICS & GYNECOLOGY

## 2021-10-29 NOTE — PATIENT INSTRUCTIONS
Weeks 14 to 18 of Your Pregnancy: Care Instructions  Overview     During this time, you may start to \"show,\" so that you look pregnant to people around you. You may also notice some changes in your skin, such as itchy spots on your palms or acne on your face. Your baby is now able to pass urine. And your baby's first stool (meconium) is starting to collect in your baby's intestines. Hair is also starting to grow on your baby's head. At your next visit, between weeks 18 and 20, your doctor may do an ultrasound test. The test allows your doctor to check for certain problems. Your doctor can also tell the sex of your baby. So this a good time to think about whether you want to know. Talk to your doctor about getting a flu shot to help keep you healthy during your pregnancy. As your pregnancy moves along, it's common to worry or feel anxious. Your body is changing a lot. And you are thinking about giving birth, the health of your baby, and becoming a parent. You can talk to your doctor about any anxiety and stress you feel. Follow-up care is a key part of your treatment and safety. Be sure to make and go to all appointments, and call your doctor if you are having problems. It's also a good idea to know your test results and keep a list of the medicines you take. How can you care for yourself at home? Reduce stress    · Ask for help with cooking and housekeeping.     · Figure out who or what causes your stress. Avoid these people or situations as much as possible.     · Relax every day. Taking 10- to 15-minute breaks can make a big difference. Take a walk, listen to music, or take a warm bath.     · Learn relaxation techniques at prenatal or yoga class. Or buy a relaxation tape.     · List your fears about having a baby and becoming a parent. Share the list with someone you trust. Decide which worries are really small, and try to let them go.    Exercise    · If you did not exercise much before pregnancy, start slowly. Walking is best. [employer], and do a little more every day.     · Brisk walking, easy jogging, low-impact aerobics, water aerobics, and yoga are good choices. Some sports, such as scuba diving, horseback riding, downhill skiing, gymnastics, and water skiing, are not a good idea.     · Try to do at least 2½ hours a week of moderate exercise, such as a fast walk. One way to do this is to be active 30 minutes a day, at least 5 days a week.     · Wear loose clothing. And wear shoes and a bra that provide good support.     · Warm up and cool down to start and finish your exercise.     · If you want to use weights, be sure to use light weights. They reduce stress on your joints. Stay at the best weight for you    · Experts recommend that you gain about 1 pound a month during the first 3 months of your pregnancy.     · Experts recommend that you gain about 1 pound a week during your last 6 months of pregnancy, for a total weight gain of 25 to 35 pounds.     · If you are underweight, you will need to gain more weight (about 28 to 40 pounds).     · If you are overweight, you may not need to gain as much weight (about 15 to 25 pounds).     · If you are gaining weight too fast, use common sense. Exercise every day, and limit sweets, fast foods, and fats. Choose lean meats, fruits, and vegetables.     · If you are having twins or more, your doctor may refer you to a dietitian. Where can you learn more? Go to http://www.gray.com/  Enter I453 in the search box to learn more about \"Weeks 14 to 18 of Your Pregnancy: Care Instructions. \"  Current as of: June 16, 2021               Content Version: 13.0  © 4834-4455 Healthwise, Incorporated. Care instructions adapted under license by [employer] (which disclaims liability or warranty for this information).  If you have questions about a medical condition or this instruction, always ask your healthcare professional. [employer] disclaims any warranty or liability for your use of this information.

## 2021-11-03 LAB
AFP ADJ MOM SERPL: 2.16
AFP INTERP SERPL-IMP: NORMAL
AFP INTERP SERPL-IMP: NORMAL
AFP SERPL-MCNC: 56.8 NG/ML
AGE AT DELIVERY: 31.9 YR
COMMENT, 018013: NORMAL
GA METHOD: NORMAL
GA: 15.9 WEEKS
IDDM PATIENT QL: NO
MULTIPLE PREGNANCY: NO
NEURAL TUBE DEFECT RISK FETUS: 569 %
RESULTS, 017004: NORMAL

## 2021-11-29 ENCOUNTER — ROUTINE PRENATAL (OUTPATIENT)
Dept: OBGYN CLINIC | Age: 31
End: 2021-11-29

## 2021-11-29 VITALS — BODY MASS INDEX: 36.78 KG/M2 | DIASTOLIC BLOOD PRESSURE: 60 MMHG | SYSTOLIC BLOOD PRESSURE: 116 MMHG | WEIGHT: 221 LBS

## 2021-11-29 DIAGNOSIS — Z34.82 ENCOUNTER FOR SUPERVISION OF OTHER NORMAL PREGNANCY IN SECOND TRIMESTER: Primary | ICD-10-CM

## 2021-11-29 PROCEDURE — 0502F SUBSEQUENT PRENATAL CARE: CPT | Performed by: OBSTETRICS & GYNECOLOGY

## 2021-11-29 NOTE — PATIENT INSTRUCTIONS
Weeks 18 to 22 of Your Pregnancy: Care Instructions  Overview     Your baby is continuing to develop quickly. Sometime between 18 and 22 weeks, you'll probably start to feel your baby move. At first, these small fetal movements feel like fluttering or \"butterflies. \" Or they may feel like gas bubbles. As your baby grows, these movements will become stronger. You may also notice that your baby hiccups. Babies at this stage can now suck their thumbs. You may find that your nausea and fatigue are gone. You may feel better overall and have more energy than you did in your first trimester. But you might now also have some new discomforts, like sleep problems or leg cramps. Talk to your doctor about things you can do at home to ease these problems. Follow-up care is a key part of your treatment and safety. Be sure to make and go to all appointments, and call your doctor if you are having problems. It's also a good idea to know your test results and keep a list of the medicines you take. How can you care for yourself at home? Ease sleep problems  · Avoid caffeine in drinks or chocolate late in the day. · Get some exercise every day. · Take a warm shower or bath before bed. · Have a light snack or glass of milk at bedtime. · Do relaxation exercises in bed to calm your mind and body. · Support your legs and back with extra pillows. Try a pillow between your legs if you sleep on your side. · Do not use sleeping pills or alcohol. They could harm your baby. Ease leg cramps  · Do not massage your calf during the cramp. · Sit on a firm bed or chair. Straighten your leg, and bend your foot (flex your ankle) slowly upward, toward your knee. Bend your toes up and down. · Stand on a cool, flat surface. Stretch your toes upward, and take small steps walking on your heels. · Use a heating pad or hot water bottle to help with muscle ache. Prevent leg cramps  · Be sure to get enough calcium.  If you are worried that you are not getting enough, talk to your doctor. · Exercise every day, and stretch your legs before bed. · Take a warm bath before bed, and try leg warmers at night. Where can you learn more? Go to http://www.gray.com/  Enter G853 in the search box to learn more about \"Weeks 18 to 22 of Your Pregnancy: Care Instructions. \"  Current as of: June 16, 2021               Content Version: 13.0  © 2006-2021 Healthwise, Incorporated. Care instructions adapted under license by [employer] (which disclaims liability or warranty for this information). If you have questions about a medical condition or this instruction, always ask your healthcare professional. [address] any warranty or liability for your use of this information.

## 2021-11-29 NOTE — PROGRESS NOTES
A SINGLE VIABLE IUP AT 20W2D GA BY LMP IS SEEN. FETAL CARDIAC MOTION OBSERVED. FETAL ANATOMY WELL VISUALIZED AND APPEARS WITHIN NORMAL LIMITS. NO ABNORMALITIES ARE SEEN ON TODAY'S EXAM.  FACE, NOSE/LIPS, PROFILE, CSP, LAT VENT, CER/CM, CP, SPINE, KIDNEYS, STOMACH/DIAPHRAGM, BLADDER, 3VC,  CORD INSERTION, RVOT, LVOT, 4CH, AO, DA, 3VV, ARMS, LEGS, HANDS, FEET. APPROPRIATE FETAL GROWTH IS SEEN. SIZE=DATES. OLGA, CERVIX AND PLACENTA APPEAR WITHIN NORMAL LIMITS. GENDER: XY, PATIENT DOES NOT KNOW.

## 2021-12-30 ENCOUNTER — ROUTINE PRENATAL (OUTPATIENT)
Dept: OBGYN CLINIC | Age: 31
End: 2021-12-30
Payer: COMMERCIAL

## 2021-12-30 VITALS — DIASTOLIC BLOOD PRESSURE: 58 MMHG | WEIGHT: 222 LBS | BODY MASS INDEX: 36.94 KG/M2 | SYSTOLIC BLOOD PRESSURE: 112 MMHG

## 2021-12-30 DIAGNOSIS — Z34.82 ENCOUNTER FOR SUPERVISION OF OTHER NORMAL PREGNANCY IN SECOND TRIMESTER: Primary | ICD-10-CM

## 2021-12-30 PROCEDURE — 0502F SUBSEQUENT PRENATAL CARE: CPT | Performed by: OBSTETRICS & GYNECOLOGY

## 2021-12-30 NOTE — PATIENT INSTRUCTIONS
Weeks 22 to 26 of Your Pregnancy: Care Instructions  Overview     As you enter your 7th month of pregnancy at week 26, your baby's lungs are growing stronger and getting ready to breathe. You may notice that your baby responds to the sound of your voice. You may also notice that your baby does less turning and twisting and more squirming, kicking, or jerking. Jerking often means that your baby has hiccups. Hiccups are normal and are only temporary. You may want to think about attending a childbirth preparation class. This is also a good time to start thinking about whether you want to have pain medicine during labor. You may be tested for gestational diabetes between weeks 25 and 28. Gestational diabetes occurs when your blood sugar level gets too high when you're pregnant. The test is important, because you can have gestational diabetes and not know it. But the condition can cause problems for your baby. Follow-up care is a key part of your treatment and safety. Be sure to make and go to all appointments, and call your doctor if you are having problems. It's also a good idea to know your test results and keep a list of the medicines you take. How can you care for yourself at home? Ease discomfort from your baby's kicking  · Change your position. Sometimes this will cause your baby to change position too. · Take a deep breath while you raise your arm over your head. Then breathe out while you drop your arm. Do Kegel exercises to prevent urine from leaking  · You can do Kegel exercises while you stand or sit. ? Squeeze the same muscles you would use to stop your urine. Your belly and thighs should not move. ? Hold the squeeze for 3 seconds, and then relax for 3 seconds. ? Start with 3 seconds. Then add 1 second each week until you are able to squeeze for 10 seconds. ? Repeat the exercise 10 to 15 times for each session. Do three or more sessions each day.   Ease or reduce swelling in your feet, ankles, hands, and fingers  · If your fingers are puffy, take off your rings. · Do not eat high-salt foods, such as potato chips. · Prop up your feet on a stool or couch as much as possible. Sleep with pillows under your feet. · Do not stand for long periods of time or wear tight shoes. · Wear support stockings. Where can you learn more? Go to http://www.lyle.com/  Enter G264 in the search box to learn more about \"Weeks 22 to 26 of Your Pregnancy: Care Instructions. \"  Current as of: June 16, 2021               Content Version: 13.0  © 5028-9881 Healthwise, [employer]. Care instructions adapted under license by [employer] (which disclaims liability or warranty for this information). If you have questions about a medical condition or this instruction, always ask your healthcare professional. [address] any warranty or liability for your use of this information.

## 2022-01-28 ENCOUNTER — ROUTINE PRENATAL (OUTPATIENT)
Dept: OBGYN CLINIC | Age: 32
End: 2022-01-28
Payer: COMMERCIAL

## 2022-01-28 VITALS — BODY MASS INDEX: 38.44 KG/M2 | SYSTOLIC BLOOD PRESSURE: 128 MMHG | DIASTOLIC BLOOD PRESSURE: 64 MMHG | WEIGHT: 231 LBS

## 2022-01-28 DIAGNOSIS — Z29.13 NEED FOR RHOGAM DUE TO RH NEGATIVE MOTHER: ICD-10-CM

## 2022-01-28 DIAGNOSIS — Z34.83 ENCOUNTER FOR SUPERVISION OF OTHER NORMAL PREGNANCY IN THIRD TRIMESTER: Primary | ICD-10-CM

## 2022-01-28 DIAGNOSIS — Z23 ENCOUNTER FOR IMMUNIZATION: ICD-10-CM

## 2022-01-28 LAB — GTT, 1 HR, GLUCOLA, EXTERNAL: 128

## 2022-01-28 PROCEDURE — 90715 TDAP VACCINE 7 YRS/> IM: CPT | Performed by: OBSTETRICS & GYNECOLOGY

## 2022-01-28 PROCEDURE — 96372 THER/PROPH/DIAG INJ SC/IM: CPT | Performed by: OBSTETRICS & GYNECOLOGY

## 2022-01-28 PROCEDURE — 90471 IMMUNIZATION ADMIN: CPT | Performed by: OBSTETRICS & GYNECOLOGY

## 2022-01-28 PROCEDURE — 0502F SUBSEQUENT PRENATAL CARE: CPT | Performed by: OBSTETRICS & GYNECOLOGY

## 2022-01-28 NOTE — PROGRESS NOTES
Administered 0.5mL TETANUS, DIPHTHERIA TOXOIDS AND ACELLULAR PERTUSSIS VACCINE (TDAP) per MD order. Patient consent signed by patient. Injection given IM in the left deltoid . Patient tolerated well, no complications, no side effects. Lot # [id number]  Exp: 11/23/23    Administered 1500 International Units of RhoGam per MD order. Verbal consent received by patient. Injection given IM in the left gluteus . Patient tolerated well, no complications, no side effects.     Lot # [id number]  Exp: 12/12/22

## 2022-01-28 NOTE — PATIENT INSTRUCTIONS
Weeks 26 to 30 of Your Pregnancy: Care Instructions  Overview     You are now entering your last trimester of pregnancy. Your baby is growing quickly. Haritha Ramirez probably feel your baby moving around more often. Your doctor may ask you to count your baby's kicks. Your back may ache as your body gets used to your baby's size and length. If you haven't already had the Tdap shot during this pregnancy, talk to your doctor about getting it. It will help protect your  against pertussis infection. During this time, it's important to take care of yourself and pay attention to what your body needs. If you feel sexual, you can explore ways to be close with your partner that match your comfort and desire. Follow-up care is a key part of your treatment and safety. Be sure to make and go to all appointments, and call your doctor if you are having problems. It's also a good idea to know your test results and keep a list of the medicines you take. How can you care for yourself at home? Take it easy at work  · Take frequent breaks. If possible, stop working when you are tired, and rest during your lunch hour. · Take bathroom breaks every 2 hours. · Change positions often. If you sit for long periods, stand up and walk around. · When you stand for a long time, keep one foot on a low stool with your knee bent. After standing a lot, sit with your feet up. · Avoid fumes, chemicals, and tobacco smoke. Be sexual in your own way  · Having sex during pregnancy is okay, unless your doctor tells you not to. · You may be very interested in sex, or you may have no interest at all. · Your growing belly can make it hard to find a good position during intercourse. [place] and explore. · You may get cramps in your uterus when your partner touches your breasts. · A back rub may relieve the backache or cramps that sometimes follow orgasm. Learn about  labor  · Watch for signs of  labor.  You may be going into labor if:  ? You have menstrual-like cramps, with or without nausea. ? You have about 6 or more contractions in 1 hour, even after you have had a glass of water and are resting. ? You have a low, dull backache that does not go away when you change your position. ? You have pain or pressure in your pelvis that comes and goes in a pattern. ? You have intestinal cramping or flu-like symptoms, with or without diarrhea.  ? You notice an increase or change in your vaginal discharge. Discharge may be heavy, mucus-like, watery, or streaked with blood. ? Your water breaks. · If you think you have  labor:  ? Drink 2 or 3 glasses of water or juice. Not drinking enough fluids can cause contractions. ? Stop what you are doing, and empty your bladder. Then lie down on your left side for at least 1 hour. ? While lying on your side, find your breast bone. Put your fingers in the soft spot just below it. Move your fingers down toward your belly button to find the top of your uterus. Check to see if it is tight. ? Contractions can be weak or strong. Record your contractions for an hour. Time a contraction from the start of one contraction to the start of the next one.  ? Single or several strong contractions without a pattern are called Weber-Puente contractions. They are practice contractions but not the start of labor. They often stop if you change what you are doing. ? Call your doctor if you have regular contractions. Where can you learn more? Go to http://www.gray.com/  Enter G254 in the search box to learn more about \"Weeks 26 to 30 of Your Pregnancy: Care Instructions. \"  Current as of: 2021               Content Version: 13.0  © 5681-5273 [employer]. Care instructions adapted under license by [employer] (which disclaims liability or warranty for this information).  If you have questions about a medical condition or this instruction, always ask your healthcare professional. [address] any warranty or liability for your use of this information. Vaccine Information Statement    Tdap (Tetanus, Diphtheria, Pertussis) Vaccine: What You Need to Know     Many vaccine information statements are available in Polish and other languages. See www.immunize.org/vis. Hojas de información sobre vacunas están disponibles en español y en muchos otros idiomas. Visite www.immunize.org/vis. 1. Why get vaccinated? Tdap vaccine can prevent tetanus, diphtheria, and pertussis. Diphtheria and pertussis spread from person to person. Tetanus enters the body through cuts or wounds.  TETANUS (T) causes painful stiffening of the muscles. Tetanus can lead to serious health problems, including being unable to open the mouth, having trouble swallowing and breathing, or death.  DIPHTHERIA (D) can lead to difficulty breathing, heart failure, paralysis, or death.  PERTUSSIS (aP), also known as whooping cough, can cause uncontrollable, violent coughing that makes it hard to breathe, eat, or drink. Pertussis can be extremely serious especially in babies and young children, causing pneumonia, convulsions, brain damage, or death. In teens and adults, it can cause weight loss, loss of bladder control, passing out, and rib fractures from severe coughing. 2. Tdap vaccine     Tdap is only for children 7 years and older, adolescents, and adults. Adolescents should receive a single dose of Tdap, preferably at age 6 or 15 years. Pregnant people should get a dose of Tdap during every pregnancy, preferably during the early part of the third trimester, to help protect the  from pertussis. Infants are most at risk for severe, life-threatening complications from pertussis. Adults who have never received Tdap should get a dose of Tdap.       Also, adults should receive a booster dose of either Tdap or Td (a different vaccine that protects against tetanus and diphtheria but not pertussis) every 10 years, or after 5 years in the case of a severe or dirty wound or burn. Tdap may be given at the same time as other vaccines. 3. Talk with your health care provider    Tell your vaccination provider if the person getting the vaccine:   Has had an allergic reaction after a previous dose of any vaccine that protects against tetanus, diphtheria, or pertussis, or has any severe, life-threatening allergies    Has had a coma, decreased level of consciousness, or prolonged seizures within 7 days after a previous dose of any pertussis vaccine (DTP, DTaP, or Tdap)   Has seizures or another nervous system problem   Has ever had Guillain-Barré Syndrome (also called GBS)   Has had severe pain or swelling after a previous dose of any vaccine that protects against tetanus or diphtheria    In some cases, your health care provider may decide to postpone Tdap vaccination until a future visit. People with minor illnesses, such as a cold, may be vaccinated. People who are moderately or severely ill should usually wait until they recover before getting Tdap vaccine. Your health care provider can give you more information. 4. Risks of a vaccine reaction     Pain, redness, or swelling where the shot was given, mild fever, headache, feeling tired, and nausea, vomiting, diarrhea, or stomachache sometimes happen after Tdap vaccination. People sometimes faint after medical procedures, including vaccination. Tell your provider if you feel dizzy or have vision changes or ringing in the ears. As with any medicine, there is a very remote chance of a vaccine causing a severe allergic reaction, other serious injury, or death. 5. What if there is a serious problem? An allergic reaction could occur after the vaccinated person leaves the clinic.  If you see signs of a severe allergic reaction (hives, swelling of the face and throat, difficulty breathing, a fast heartbeat, dizziness, or weakness), call 9-1-1 and get the person to the nearest hospital.    For other signs that concern you, call your health care provider. Adverse reactions should be reported to the Vaccine Adverse Event Reporting System (VAERS). Your health care provider will usually file this report, or you can do it yourself. Visit the VAERS website at www.vaers. [facility].gov or call 2-224.981.3527. VAERS is only for reporting reactions, and VAERS staff members do not give medical advice. 6. The National Vaccine Injury Compensation Program    The [facility] Vaccine Injury Compensation Program (VICP) is a federal program that was created to compensate people who may have been injured by certain vaccines. Claims regarding alleged injury or death due to vaccination have a time limit for filing, which may be as short as two years. Visit the VICP website at www.[facility].gov/vaccinecompensation or call 1-511.898.9761 to learn about the program and about filing a claim. 7. How can I learn more?  Ask your health care provider.  Call your local or state health department.  Visit the website of the Food and Drug Administration (FDA) for vaccine package inserts and additional information at www.fda.gov/vaccines-blood-biologics/vaccines.  Contact the Centers for Disease Control and Prevention (CDC):  - Call 0-254.760.3583 (1-800-CDC-INFO) or  - Visit CDCs website at www.cdc.gov/vaccines. Vaccine Information Statement   Tdap (Tetanus, Diphtheria, Pertussis) Vaccine  8/6/2021  42 [address] [id number]   Department of Health and Human Services  Centers for Disease Control and Prevention    Office Use Only

## 2022-01-29 LAB
ERYTHROCYTE [DISTWIDTH] IN BLOOD BY AUTOMATED COUNT: 14 % (ref 11.5–14.5)
GLUCOSE 1H P 100 G GLC PO SERPL-MCNC: 128 MG/DL (ref 65–140)
HCT VFR BLD AUTO: 35.2 % (ref 35–47)
HGB BLD-MCNC: 10.9 G/DL (ref 11.5–16)
MCH RBC QN AUTO: 29.8 PG (ref 26–34)
MCHC RBC AUTO-ENTMCNC: 31 G/DL (ref 30–36.5)
MCV RBC AUTO: 96.2 FL (ref 80–99)
NRBC # BLD: 0 K/UL (ref 0–0.01)
NRBC BLD-RTO: 0 PER 100 WBC
PLATELET # BLD AUTO: 208 K/UL (ref 150–400)
PMV BLD AUTO: 10.9 FL (ref 8.9–12.9)
RBC # BLD AUTO: 3.66 M/UL (ref 3.8–5.2)
WBC # BLD AUTO: 10.1 K/UL (ref 3.6–11)

## 2022-01-30 LAB
BLOOD BANK CMNT PATIENT-IMP: NORMAL
BLOOD GROUP ANTIBODIES SERPL: NORMAL

## 2022-02-18 ENCOUNTER — ROUTINE PRENATAL (OUTPATIENT)
Dept: OBGYN CLINIC | Age: 32
End: 2022-02-18
Payer: COMMERCIAL

## 2022-02-18 VITALS — BODY MASS INDEX: 38.77 KG/M2 | WEIGHT: 233 LBS | SYSTOLIC BLOOD PRESSURE: 118 MMHG | DIASTOLIC BLOOD PRESSURE: 62 MMHG

## 2022-02-18 DIAGNOSIS — Z34.80 ENCOUNTER FOR SUPERVISION OF OTHER NORMAL PREGNANCY, UNSPECIFIED TRIMESTER: ICD-10-CM

## 2022-02-18 PROCEDURE — 0502F SUBSEQUENT PRENATAL CARE: CPT | Performed by: OBSTETRICS & GYNECOLOGY

## 2022-02-18 NOTE — PROGRESS NOTES
Doing fine. Second Covid last week. Sx's for 4 days.   Had much more severe infection in August very early in pregnancy  US next for growth

## 2022-03-10 ENCOUNTER — ROUTINE PRENATAL (OUTPATIENT)
Dept: OBGYN CLINIC | Age: 32
End: 2022-03-10

## 2022-03-10 VITALS — BODY MASS INDEX: 38.94 KG/M2 | SYSTOLIC BLOOD PRESSURE: 132 MMHG | WEIGHT: 234 LBS | DIASTOLIC BLOOD PRESSURE: 60 MMHG

## 2022-03-10 DIAGNOSIS — Z34.80 ENCOUNTER FOR SUPERVISION OF OTHER NORMAL PREGNANCY, UNSPECIFIED TRIMESTER: Primary | ICD-10-CM

## 2022-03-10 PROCEDURE — 0502F SUBSEQUENT PRENATAL CARE: CPT | Performed by: OBSTETRICS & GYNECOLOGY

## 2022-03-10 NOTE — PROGRESS NOTES
A SINGLE VERTEX 34W5D IUP IS SEEN. FETAL CARDIAC MOTION OBSERVED. LIMITED ANATOMY WAS VISUALIZED AND APPEARS WNL. APPROPRIATE FETAL GROWTH IS SEEN. SIZE=DATES. PLACENTA APPEAR WITHIN NORMAL LIMITS. OLGA APPEARS TO BE AT THE UPPER LIMITS AND MEASURES 24-25CM.

## 2022-03-10 NOTE — PATIENT INSTRUCTIONS
Relevant Problems   No relevant active problems       Physical Exam    Airway   Mallampati: II  TM distance: >3 FB  Neck ROM: full       Cardiovascular - normal exam  Rhythm: regular  Rate: normal  (-) murmur     Dental - normal exam           Pulmonary - normal exam  Breath sounds clear to auscultation     Abdominal    Neurological - normal exam                 Anesthesia Plan    ASA 1       Plan - general       Airway plan will be ETT        Induction: intravenous    Postoperative Plan: Postoperative administration of opioids is intended.    Pertinent diagnostic labs and testing reviewed    Informed Consent:    Anesthetic plan and risks discussed with patient.    Use of blood products discussed with: patient whom consented to blood products.          Weeks 34 to 36 of Your Pregnancy: Care Instructions  Overview     By now, your baby and your belly have grown quite large. It's almost time to give birth! Your baby's lungs are almost ready to breathe air. The skull bones are firm enough to protect your baby's head, but soft enough to move down through the birth canal.  You may be feeling excited and happy at times--but also anxious or scared. You might wonder how you'll know if you're in labor or what to expect during labor. Try to be open and flexible in your expectations of the birth. Because each birth is different, there's no way to know exactly what childbirth will be like for you. Talk to your doctor or midwife about any concerns you have. If you haven't already had the Tdap shot during this pregnancy, talk to your doctor about getting it. It will help protect your  against pertussis infection. In the 36th week, you'll probably have a test for group B streptococcus (GBS). GBS is a common type of bacteria that can live in the vagina and rectum. It can make your baby sick after birth. If you test positive, you will get antibiotics during labor. The medicine will help keep your baby from getting the bacteria. Follow-up care is a key part of your treatment and safety. Be sure to make and go to all appointments, and call your doctor if you are having problems. It's also a good idea to know your test results and keep a list of the medicines you take. How can you care for yourself at home? Learn about pain relief choices  · Pain is different for everyone. Talk with your doctor about your feelings about pain. · You can choose from several types of pain relief. These include medicine, breathing techniques, and comfort measures. You can use more than one option. · If you choose to have pain medicine during labor, talk to your doctor about your options. Some medicines lower anxiety and help with some of the pain.  Others make your lower body numb so that you won't feel pain. · Be sure to tell your doctor about your pain medicine choice before you start labor or very early in your labor. You may be able to change your mind as labor progresses. Labor and delivery  · The first stage of labor has three parts: early, active, and transition. ? It's common to have early labor at home. You can stay busy or rest, eat light snacks, drink clear fluids, and start counting contractions. ? When talking during a contraction gets hard, you may be moving to active labor. During active labor, you should head for the hospital if you aren't there already. ? You are in active labor when contractions come every 3 to 4 minutes and last about 60 seconds. Your cervix is opening more rapidly. ? If your water breaks, contractions will come faster and stronger. ? During transition, your cervix is stretching, and contractions are coming more rapidly. ? You may want to push, but your cervix might not be ready. Your doctor will tell you when to push. · The second stage starts when your cervix is completely opened and you are ready to push. ? Contractions are very strong to push the baby down the birth canal.  ? You will probably feel the urge to push. You may feel like you need to have a bowel movement. ? You may be coached to push with contractions. These contractions will be very strong, but you won't have them as often. You can get a little rest between contractions. ? One last push, and your baby is born. · The third stage is when a few more contractions push out the placenta. This may take 30 minutes or less. Where can you learn more? Go to http://www.gray.com/  Enter B912 in the search box to learn more about \"Weeks 34 to 36 of Your Pregnancy: Care Instructions. \"  Current as of: June 16, 2021               Content Version: 13.2  © 4649-5660 [employer].    Care instructions adapted under license by [employer] (which disclaims liability or warranty for this information). If you have questions about a medical condition or this instruction, always ask your healthcare professional. [address] any warranty or liability for your use of this information.

## 2022-03-19 PROBLEM — E66.01 SEVERE OBESITY (HCC): Status: ACTIVE | Noted: 2019-04-29

## 2022-03-25 ENCOUNTER — ROUTINE PRENATAL (OUTPATIENT)
Dept: OBGYN CLINIC | Age: 32
End: 2022-03-25
Payer: COMMERCIAL

## 2022-03-25 VITALS — DIASTOLIC BLOOD PRESSURE: 60 MMHG | SYSTOLIC BLOOD PRESSURE: 122 MMHG | WEIGHT: 239 LBS | BODY MASS INDEX: 39.77 KG/M2

## 2022-03-25 DIAGNOSIS — Z34.83 ENCOUNTER FOR SUPERVISION OF OTHER NORMAL PREGNANCY IN THIRD TRIMESTER: Primary | ICD-10-CM

## 2022-03-25 LAB
GRBS, EXTERNAL: NEGATIVE
GRBS, EXTERNAL: POSITIVE

## 2022-03-25 PROCEDURE — 0502F SUBSEQUENT PRENATAL CARE: CPT | Performed by: OBSTETRICS & GYNECOLOGY

## 2022-03-25 NOTE — PATIENT INSTRUCTIONS
Week 40 of Your Pregnancy: Care Instructions  Overview     You are near the end of your pregnancy--and you're probably pretty uncomfortable. It may be harder to walk around. Lying down probably isn't comfortable either. You may have trouble getting to sleep or staying asleep. Most babies are born between 40 and 41 weeks. This is a good time to think about packing a bag for the hospital with items you'll need. Then you'll be ready when labor starts. Follow-up care is a key part of your treatment and safety. Be sure to make and go to all appointments, and call your doctor if you are having problems. It's also a good idea to know your test results and keep a list of the medicines you take. How can you care for yourself at home? Learn about breastfeeding  · Breastfeeding is best for your baby and good for you. · Breast milk has antibodies to help your baby fight infections. · If you breastfeed, you may lose weight faster. That's because making milk burns calories. · Learning the best ways to hold your baby will make breastfeeding easier. · Sometimes breastfeeding can make partners feel left out. If you have a partner, plan how you can care for your baby together. For example, your partner can bathe and diaper the baby. You can snuggle together when you breastfeed. · You may want to learn how to use a breast pump and store your milk. · If you choose to bottle feed, make the feeding feel like breastfeeding so you can bond with your baby. Always hold your baby and the bottle. Don't prop bottles or let your baby fall asleep with a bottle. Learn about crying  · It's common for babies to cry for 1 to 3 hours a day. Some cry more, and some cry less. · Babies don't cry to make you upset or because you're a bad parent. · Crying is how your baby communicates. Your baby may be hungry; have gas; need a diaper change; or feel cold, warm, tired, lonely, or tense. Sometimes babies cry for unknown reasons.   · If you respond to your baby's needs, your baby will learn to trust you. · Try to stay calm when your baby cries. Your baby may get more upset if they sense that you are upset. Know how to care for your   · Your baby's umbilical cord stump will drop off on its own, usually between 1 and 2 weeks. To care for your baby's umbilical cord area:  ? Clean the area at the bottom of the cord 2 or 3 times a day. ? Pay special attention to the area where the cord attaches to the skin. ? Keep the diaper folded below the cord. ? Use a damp washcloth or cotton ball to sponge bathe your baby until the stump has come off. · Your baby's first dark stool is called meconium. After the meconium is passed, your baby will develop their own bowel pattern. ? Some babies, especially  babies, have several bowel movements a day. Others have one or two a day, or one every 2 to 3 days. ?  babies often have loose, yellow stools. Formula-fed babies have more formed stools. ? If your baby's stools look like little pellets, your baby is constipated. After 2 days of constipation, call your baby's doctor. · If your baby will be circumcised, you can care for your baby at home. ? Gently rinse your baby's penis with warm water after every diaper change. Don't try to remove the film that forms on the penis. This film will go away on its own. Pat dry. ? Put petroleum ointment, such as Vaseline, on the area of the diaper that will touch your baby's penis. This will keep the diaper from sticking to your baby. ? Ask the doctor about giving your baby acetaminophen (Tylenol) for pain. Where can you learn more? Go to http://www.gray.com/  Enter N257 in the search box to learn more about \"Week 37 of Your Pregnancy: Care Instructions. \"  Current as of: 2021               Content Version: 13.2  © 5052-5779 [employer].    Care instructions adapted under license by [employer] (which disclaims liability or warranty for this information). If you have questions about a medical condition or this instruction, always ask your healthcare professional. [address] any warranty or liability for your use of this information.

## 2022-03-28 LAB — B-HEM STREP SPEC QL CULT: POSITIVE

## 2022-04-01 ENCOUNTER — ROUTINE PRENATAL (OUTPATIENT)
Dept: OBGYN CLINIC | Age: 32
End: 2022-04-01
Payer: COMMERCIAL

## 2022-04-01 VITALS — BODY MASS INDEX: 39.84 KG/M2 | DIASTOLIC BLOOD PRESSURE: 62 MMHG | WEIGHT: 239.4 LBS | SYSTOLIC BLOOD PRESSURE: 114 MMHG

## 2022-04-01 DIAGNOSIS — Z34.80 ENCOUNTER FOR SUPERVISION OF OTHER NORMAL PREGNANCY, UNSPECIFIED TRIMESTER: Primary | ICD-10-CM

## 2022-04-01 PROCEDURE — 0502F SUBSEQUENT PRENATAL CARE: CPT | Performed by: OBSTETRICS & GYNECOLOGY

## 2022-04-01 NOTE — PROGRESS NOTES
Still out of pelvis and unstable lie  May need to schedule induction, place Cook catheter, vert to cephalic and ARM after next appt

## 2022-04-08 ENCOUNTER — ROUTINE PRENATAL (OUTPATIENT)
Dept: OBGYN CLINIC | Age: 32
End: 2022-04-08
Payer: COMMERCIAL

## 2022-04-08 VITALS — DIASTOLIC BLOOD PRESSURE: 68 MMHG | WEIGHT: 239.2 LBS | SYSTOLIC BLOOD PRESSURE: 115 MMHG | BODY MASS INDEX: 39.8 KG/M2

## 2022-04-08 DIAGNOSIS — Z34.80 ENCOUNTER FOR SUPERVISION OF OTHER NORMAL PREGNANCY, UNSPECIFIED TRIMESTER: ICD-10-CM

## 2022-04-08 PROCEDURE — 0502F SUBSEQUENT PRENATAL CARE: CPT | Performed by: OBSTETRICS & GYNECOLOGY

## 2022-04-08 NOTE — PROGRESS NOTES
Still no presenting part.   Can't use Cook catheter and version would not be helpful  Discussed scheduling C/S for next week  Labor and PROM warnings for cord prolapse discussed

## 2022-04-11 ENCOUNTER — ROUTINE PRENATAL (OUTPATIENT)
Dept: OBGYN CLINIC | Age: 32
End: 2022-04-11
Payer: COMMERCIAL

## 2022-04-11 VITALS — SYSTOLIC BLOOD PRESSURE: 122 MMHG | DIASTOLIC BLOOD PRESSURE: 64 MMHG | BODY MASS INDEX: 39.57 KG/M2 | WEIGHT: 237.8 LBS

## 2022-04-11 DIAGNOSIS — Z34.80 ENCOUNTER FOR SUPERVISION OF OTHER NORMAL PREGNANCY, UNSPECIFIED TRIMESTER: ICD-10-CM

## 2022-04-11 PROCEDURE — 0502F SUBSEQUENT PRENATAL CARE: CPT | Performed by: OBSTETRICS & GYNECOLOGY

## 2022-04-11 NOTE — PROGRESS NOTES
Advised need for C/S. Fetus is very stable in transverse lie with no chance of version for vaginal delivery  IC obtained for LTCS.

## 2022-04-12 ENCOUNTER — HOSPITAL ENCOUNTER (INPATIENT)
Age: 32
LOS: 2 days | Discharge: HOME | End: 2022-04-14
Attending: OBSTETRICS & GYNECOLOGY | Admitting: OBSTETRICS & GYNECOLOGY
Payer: COMMERCIAL

## 2022-04-12 ENCOUNTER — ANESTHESIA EVENT (OUTPATIENT)
Dept: LABOR AND DELIVERY | Age: 32
End: 2022-04-12
Payer: COMMERCIAL

## 2022-04-12 ENCOUNTER — ANESTHESIA (OUTPATIENT)
Dept: LABOR AND DELIVERY | Age: 32
End: 2022-04-12
Payer: COMMERCIAL

## 2022-04-12 DIAGNOSIS — G89.18 POSTOPERATIVE PAIN: Primary | ICD-10-CM

## 2022-04-12 LAB
ABO + RH BLD: NORMAL
BASOPHILS # BLD: 0.1 K/UL (ref 0–0.1)
BASOPHILS NFR BLD: 1 % (ref 0–1)
BLOOD GROUP ANTIBODIES SERPL: NORMAL
BLOOD GROUP ANTIBODIES SERPL: NORMAL
DIFFERENTIAL METHOD BLD: ABNORMAL
EOSINOPHIL # BLD: 0.1 K/UL (ref 0–0.4)
EOSINOPHIL NFR BLD: 1 % (ref 0–7)
ERYTHROCYTE [DISTWIDTH] IN BLOOD BY AUTOMATED COUNT: 13.3 % (ref 11.5–14.5)
HCT VFR BLD AUTO: 34 % (ref 35–47)
HGB BLD-MCNC: 11.7 G/DL (ref 11.5–16)
IMM GRANULOCYTES # BLD AUTO: 0.1 K/UL (ref 0–0.04)
IMM GRANULOCYTES NFR BLD AUTO: 1 % (ref 0–0.5)
LYMPHOCYTES # BLD: 2.3 K/UL (ref 0.8–3.5)
LYMPHOCYTES NFR BLD: 25 % (ref 12–49)
MCH RBC QN AUTO: 30.1 PG (ref 26–34)
MCHC RBC AUTO-ENTMCNC: 34.4 G/DL (ref 30–36.5)
MCV RBC AUTO: 87.4 FL (ref 80–99)
MONOCYTES # BLD: 0.8 K/UL (ref 0–1)
MONOCYTES NFR BLD: 8 % (ref 5–13)
NEUTS SEG # BLD: 6 K/UL (ref 1.8–8)
NEUTS SEG NFR BLD: 64 % (ref 32–75)
NRBC # BLD: 0 K/UL (ref 0–0.01)
NRBC BLD-RTO: 0 PER 100 WBC
PLATELET # BLD AUTO: 213 K/UL (ref 150–400)
PMV BLD AUTO: 10.2 FL (ref 8.9–12.9)
RBC # BLD AUTO: 3.89 M/UL (ref 3.8–5.2)
SPECIMEN EXP DATE BLD: NORMAL
WBC # BLD AUTO: 9.3 K/UL (ref 3.6–11)

## 2022-04-12 PROCEDURE — 36415 COLL VENOUS BLD VENIPUNCTURE: CPT

## 2022-04-12 PROCEDURE — 85025 COMPLETE CBC W/AUTO DIFF WBC: CPT

## 2022-04-12 PROCEDURE — 75410000003 HC RECOV DEL/VAG/CSECN EA 0.5 HR: Performed by: OBSTETRICS & GYNECOLOGY

## 2022-04-12 PROCEDURE — 86900 BLOOD TYPING SEROLOGIC ABO: CPT

## 2022-04-12 PROCEDURE — 76010000392 HC C SECN EA ADDL 0.5 HR: Performed by: OBSTETRICS & GYNECOLOGY

## 2022-04-12 PROCEDURE — 74011000250 HC RX REV CODE- 250

## 2022-04-12 PROCEDURE — 59510 CESAREAN DELIVERY: CPT | Performed by: OBSTETRICS & GYNECOLOGY

## 2022-04-12 PROCEDURE — 77030005513 HC CATH URETH FOL11 MDII -B

## 2022-04-12 PROCEDURE — 74011000250 HC RX REV CODE- 250: Performed by: OBSTETRICS & GYNECOLOGY

## 2022-04-12 PROCEDURE — 74011250637 HC RX REV CODE- 250/637: Performed by: OBSTETRICS & GYNECOLOGY

## 2022-04-12 PROCEDURE — 74011250636 HC RX REV CODE- 250/636: Performed by: OBSTETRICS & GYNECOLOGY

## 2022-04-12 PROCEDURE — 74011250636 HC RX REV CODE- 250/636: Performed by: ANESTHESIOLOGY

## 2022-04-12 PROCEDURE — 76060000078 HC EPIDURAL ANESTHESIA: Performed by: OBSTETRICS & GYNECOLOGY

## 2022-04-12 PROCEDURE — 76010000391 HC C SECN FIRST 1 HR: Performed by: OBSTETRICS & GYNECOLOGY

## 2022-04-12 PROCEDURE — 74011000250 HC RX REV CODE- 250: Performed by: ANESTHESIOLOGY

## 2022-04-12 PROCEDURE — 77030008467 HC STPLR SKN COVD -B

## 2022-04-12 PROCEDURE — 2709999900 HC NON-CHARGEABLE SUPPLY

## 2022-04-12 PROCEDURE — 77030040361 HC SLV COMPR DVT MDII -B

## 2022-04-12 PROCEDURE — 77030033542 HC RETRCTR RETNTS PANICLS GQSM -B

## 2022-04-12 PROCEDURE — 86870 RBC ANTIBODY IDENTIFICATION: CPT

## 2022-04-12 PROCEDURE — 74011250636 HC RX REV CODE- 250/636

## 2022-04-12 PROCEDURE — 65270000029 HC RM PRIVATE

## 2022-04-12 RX ORDER — SODIUM CHLORIDE, SODIUM LACTATE, POTASSIUM CHLORIDE, CALCIUM CHLORIDE 600; 310; 30; 20 MG/100ML; MG/100ML; MG/100ML; MG/100ML
1000 INJECTION, SOLUTION INTRAVENOUS CONTINUOUS
Status: DISCONTINUED | OUTPATIENT
Start: 2022-04-12 | End: 2022-04-12 | Stop reason: HOSPADM

## 2022-04-12 RX ORDER — NALOXONE HYDROCHLORIDE 0.4 MG/ML
0.2 INJECTION, SOLUTION INTRAMUSCULAR; INTRAVENOUS; SUBCUTANEOUS
Status: ACTIVE | OUTPATIENT
Start: 2022-04-12 | End: 2022-04-13

## 2022-04-12 RX ORDER — SODIUM CHLORIDE 0.9 % (FLUSH) 0.9 %
5-40 SYRINGE (ML) INJECTION AS NEEDED
Status: DISCONTINUED | OUTPATIENT
Start: 2022-04-12 | End: 2022-04-14 | Stop reason: HOSPADM

## 2022-04-12 RX ORDER — SODIUM CHLORIDE, SODIUM LACTATE, POTASSIUM CHLORIDE, CALCIUM CHLORIDE 600; 310; 30; 20 MG/100ML; MG/100ML; MG/100ML; MG/100ML
125 INJECTION, SOLUTION INTRAVENOUS CONTINUOUS
Status: DISCONTINUED | OUTPATIENT
Start: 2022-04-12 | End: 2022-04-14 | Stop reason: HOSPADM

## 2022-04-12 RX ORDER — SIMETHICONE 80 MG
80 TABLET,CHEWABLE ORAL
Status: DISCONTINUED | OUTPATIENT
Start: 2022-04-12 | End: 2022-04-14 | Stop reason: HOSPADM

## 2022-04-12 RX ORDER — DIPHENHYDRAMINE HCL 25 MG
25 CAPSULE ORAL
Status: DISCONTINUED | OUTPATIENT
Start: 2022-04-12 | End: 2022-04-14 | Stop reason: HOSPADM

## 2022-04-12 RX ORDER — OXYCODONE HYDROCHLORIDE 5 MG/1
10 TABLET ORAL
Status: ACTIVE | OUTPATIENT
Start: 2022-04-12 | End: 2022-04-13

## 2022-04-12 RX ORDER — OXYTOCIN/RINGER'S LACTATE 30/500 ML
10 PLASTIC BAG, INJECTION (ML) INTRAVENOUS AS NEEDED
Status: DISCONTINUED | OUTPATIENT
Start: 2022-04-12 | End: 2022-04-14 | Stop reason: HOSPADM

## 2022-04-12 RX ORDER — SODIUM CHLORIDE 0.9 % (FLUSH) 0.9 %
5-40 SYRINGE (ML) INJECTION EVERY 8 HOURS
Status: DISCONTINUED | OUTPATIENT
Start: 2022-04-12 | End: 2022-04-14 | Stop reason: HOSPADM

## 2022-04-12 RX ORDER — HYDROCODONE BITARTRATE AND ACETAMINOPHEN 10; 325 MG/1; MG/1
1 TABLET ORAL
Status: DISCONTINUED | OUTPATIENT
Start: 2022-04-13 | End: 2022-04-14 | Stop reason: HOSPADM

## 2022-04-12 RX ORDER — SODIUM CHLORIDE, SODIUM LACTATE, POTASSIUM CHLORIDE, CALCIUM CHLORIDE 600; 310; 30; 20 MG/100ML; MG/100ML; MG/100ML; MG/100ML
INJECTION, SOLUTION INTRAVENOUS
Status: DISCONTINUED | OUTPATIENT
Start: 2022-04-12 | End: 2022-04-12 | Stop reason: HOSPADM

## 2022-04-12 RX ORDER — OXYCODONE HYDROCHLORIDE 5 MG/1
5 TABLET ORAL
Status: ACTIVE | OUTPATIENT
Start: 2022-04-12 | End: 2022-04-13

## 2022-04-12 RX ORDER — SODIUM CHLORIDE 0.9 % (FLUSH) 0.9 %
5-40 SYRINGE (ML) INJECTION EVERY 8 HOURS
Status: DISCONTINUED | OUTPATIENT
Start: 2022-04-12 | End: 2022-04-12 | Stop reason: HOSPADM

## 2022-04-12 RX ORDER — NALOXONE HYDROCHLORIDE 0.4 MG/ML
0.4 INJECTION, SOLUTION INTRAMUSCULAR; INTRAVENOUS; SUBCUTANEOUS AS NEEDED
Status: DISCONTINUED | OUTPATIENT
Start: 2022-04-12 | End: 2022-04-14 | Stop reason: HOSPADM

## 2022-04-12 RX ORDER — MORPHINE SULFATE 0.5 MG/ML
INJECTION, SOLUTION EPIDURAL; INTRATHECAL; INTRAVENOUS AS NEEDED
Status: DISCONTINUED | OUTPATIENT
Start: 2022-04-12 | End: 2022-04-12 | Stop reason: HOSPADM

## 2022-04-12 RX ORDER — ACETAMINOPHEN 325 MG/1
650 TABLET ORAL
Status: DISCONTINUED | OUTPATIENT
Start: 2022-04-12 | End: 2022-04-14 | Stop reason: HOSPADM

## 2022-04-12 RX ORDER — NALBUPHINE HYDROCHLORIDE 10 MG/ML
5 INJECTION, SOLUTION INTRAMUSCULAR; INTRAVENOUS; SUBCUTANEOUS
Status: ACTIVE | OUTPATIENT
Start: 2022-04-12 | End: 2022-04-13

## 2022-04-12 RX ORDER — ZOLPIDEM TARTRATE 5 MG/1
5 TABLET ORAL
Status: DISCONTINUED | OUTPATIENT
Start: 2022-04-12 | End: 2022-04-14 | Stop reason: HOSPADM

## 2022-04-12 RX ORDER — TRANEXAMIC ACID 100 MG/ML
1 INJECTION, SOLUTION INTRAVENOUS ONCE
Status: COMPLETED | OUTPATIENT
Start: 2022-04-12 | End: 2022-04-12

## 2022-04-12 RX ORDER — OXYTOCIN/RINGER'S LACTATE 30/500 ML
87.3 PLASTIC BAG, INJECTION (ML) INTRAVENOUS AS NEEDED
Status: DISCONTINUED | OUTPATIENT
Start: 2022-04-12 | End: 2022-04-14 | Stop reason: HOSPADM

## 2022-04-12 RX ORDER — HYDROMORPHONE HYDROCHLORIDE 1 MG/ML
1 INJECTION, SOLUTION INTRAMUSCULAR; INTRAVENOUS; SUBCUTANEOUS
Status: DISCONTINUED | OUTPATIENT
Start: 2022-04-12 | End: 2022-04-14 | Stop reason: HOSPADM

## 2022-04-12 RX ORDER — METHYLERGONOVINE MALEATE 0.2 MG/ML
INJECTION INTRAVENOUS AS NEEDED
Status: DISCONTINUED | OUTPATIENT
Start: 2022-04-12 | End: 2022-04-12 | Stop reason: HOSPADM

## 2022-04-12 RX ORDER — BUPIVACAINE HYDROCHLORIDE 7.5 MG/ML
INJECTION, SOLUTION EPIDURAL; RETROBULBAR AS NEEDED
Status: DISCONTINUED | OUTPATIENT
Start: 2022-04-12 | End: 2022-04-12 | Stop reason: HOSPADM

## 2022-04-12 RX ORDER — ONDANSETRON 2 MG/ML
INJECTION INTRAMUSCULAR; INTRAVENOUS AS NEEDED
Status: DISCONTINUED | OUTPATIENT
Start: 2022-04-12 | End: 2022-04-12 | Stop reason: HOSPADM

## 2022-04-12 RX ORDER — IBUPROFEN 800 MG/1
800 TABLET ORAL EVERY 8 HOURS
Status: DISCONTINUED | OUTPATIENT
Start: 2022-04-14 | End: 2022-04-13

## 2022-04-12 RX ORDER — SODIUM CHLORIDE 0.9 % (FLUSH) 0.9 %
5-40 SYRINGE (ML) INJECTION AS NEEDED
Status: DISCONTINUED | OUTPATIENT
Start: 2022-04-12 | End: 2022-04-12 | Stop reason: HOSPADM

## 2022-04-12 RX ORDER — OXYTOCIN 10 [USP'U]/ML
INJECTION, SOLUTION INTRAMUSCULAR; INTRAVENOUS AS NEEDED
Status: DISCONTINUED | OUTPATIENT
Start: 2022-04-12 | End: 2022-04-12 | Stop reason: HOSPADM

## 2022-04-12 RX ORDER — EPHEDRINE SULFATE/0.9% NACL/PF 50 MG/5 ML
SYRINGE (ML) INTRAVENOUS AS NEEDED
Status: DISCONTINUED | OUTPATIENT
Start: 2022-04-12 | End: 2022-04-12 | Stop reason: HOSPADM

## 2022-04-12 RX ORDER — ADHESIVE BANDAGE
30 BANDAGE TOPICAL DAILY PRN
Status: DISCONTINUED | OUTPATIENT
Start: 2022-04-12 | End: 2022-04-14 | Stop reason: HOSPADM

## 2022-04-12 RX ORDER — FENTANYL CITRATE 50 UG/ML
INJECTION, SOLUTION INTRAMUSCULAR; INTRAVENOUS AS NEEDED
Status: DISCONTINUED | OUTPATIENT
Start: 2022-04-12 | End: 2022-04-12 | Stop reason: HOSPADM

## 2022-04-12 RX ORDER — KETOROLAC TROMETHAMINE 30 MG/ML
30 INJECTION, SOLUTION INTRAMUSCULAR; INTRAVENOUS
Status: DISCONTINUED | OUTPATIENT
Start: 2022-04-12 | End: 2022-04-13

## 2022-04-12 RX ORDER — ONDANSETRON 2 MG/ML
8 INJECTION INTRAMUSCULAR; INTRAVENOUS
Status: DISCONTINUED | OUTPATIENT
Start: 2022-04-12 | End: 2022-04-14 | Stop reason: HOSPADM

## 2022-04-12 RX ADMIN — SODIUM CHLORIDE, POTASSIUM CHLORIDE, SODIUM LACTATE AND CALCIUM CHLORIDE: 600; 310; 30; 20 INJECTION, SOLUTION INTRAVENOUS at 07:46

## 2022-04-12 RX ADMIN — KETOROLAC TROMETHAMINE 30 MG: 30 INJECTION, SOLUTION INTRAMUSCULAR at 12:14

## 2022-04-12 RX ADMIN — MORPHINE SULFATE 250 MCG: 0.5 INJECTION, SOLUTION EPIDURAL; INTRATHECAL; INTRAVENOUS at 07:48

## 2022-04-12 RX ADMIN — SODIUM CHLORIDE, POTASSIUM CHLORIDE, SODIUM LACTATE AND CALCIUM CHLORIDE 125 ML/HR: 600; 310; 30; 20 INJECTION, SOLUTION INTRAVENOUS at 09:09

## 2022-04-12 RX ADMIN — SIMETHICONE 80 MG: 80 TABLET, CHEWABLE ORAL at 16:13

## 2022-04-12 RX ADMIN — DIPHENHYDRAMINE HYDROCHLORIDE 25 MG: 25 CAPSULE ORAL at 21:29

## 2022-04-12 RX ADMIN — SODIUM CHLORIDE, POTASSIUM CHLORIDE, SODIUM LACTATE AND CALCIUM CHLORIDE 1000 ML: 600; 310; 30; 20 INJECTION, SOLUTION INTRAVENOUS at 07:27

## 2022-04-12 RX ADMIN — CEFAZOLIN SODIUM 2 G: 1 POWDER, FOR SOLUTION INTRAMUSCULAR; INTRAVENOUS at 07:54

## 2022-04-12 RX ADMIN — ONDANSETRON 8 MG: 2 INJECTION INTRAMUSCULAR; INTRAVENOUS at 16:13

## 2022-04-12 RX ADMIN — TRANEXAMIC ACID 1000 MG: 1 INJECTION, SOLUTION INTRAVENOUS at 10:35

## 2022-04-12 RX ADMIN — ONDANSETRON HYDROCHLORIDE 4 MG: 2 SOLUTION INTRAMUSCULAR; INTRAVENOUS at 08:29

## 2022-04-12 RX ADMIN — SODIUM CHLORIDE, POTASSIUM CHLORIDE, SODIUM LACTATE AND CALCIUM CHLORIDE 500 ML: 600; 310; 30; 20 INJECTION, SOLUTION INTRAVENOUS at 16:14

## 2022-04-12 RX ADMIN — BUPIVACAINE HYDROCHLORIDE 1.8 ML: 7.5 INJECTION, SOLUTION EPIDURAL; RETROBULBAR at 07:48

## 2022-04-12 RX ADMIN — KETOROLAC TROMETHAMINE 30 MG: 30 INJECTION, SOLUTION INTRAMUSCULAR at 18:16

## 2022-04-12 RX ADMIN — Medication 20 MG: at 08:26

## 2022-04-12 RX ADMIN — FENTANYL CITRATE 20 MCG: 50 INJECTION, SOLUTION INTRAMUSCULAR; INTRAVENOUS at 07:48

## 2022-04-12 RX ADMIN — OXYTOCIN 30 UNITS: 10 INJECTION, SOLUTION INTRAMUSCULAR; INTRAVENOUS at 08:11

## 2022-04-12 NOTE — LACTATION NOTE
This note was copied from a baby's chart. Mother resting in bed. Baby in nursery under radiant warmer for observation. Mother states she was able to breast feed baby post . Discussed with mother her plan for feeding. Reviewed the benefits of exclusive breast milk feeding during the hospital stay. Informed her of the risks of using formula to supplement in the first few days of life as well as the benefits of successful breast milk feeding; referred her to the Breastfeeding booklet about this information. She acknowledges understanding of information reviewed and states that it is her plan to breast/bottle feed her infant. Will support her choice and offer additional information as needed. Encouraged mom to attempt feeding with baby led feeding cues. Just as sucking on fingers, rooting, mouthing. Looking for 8-12 feedings in 24 hours. Don't limit baby at breast, allow baby to come of breast on it's own. Baby may want to feed  often and may increase number of feedings on second day of life. Skin to skin encouraged. If baby doesn't nurse,  Mom should  hand express  10-20 drops of colostrum and drip into baby's mouth, or give to baby by finger feeding, cup feeding, or spoon feeding at least every 2-3 hours. Mother will successfully establish breastfeeding by feeding in response to early feeding cues   or wake every 3h, will obtain deep latch, and will keep log of feedings/output. Taught to BF at hunger cues and or q 2-3 hrs and to offer 10-20 drops of hand expressed colostrum at any non-feeds. Breast- Feeding Assessment  Attends Breast-Feeding Classes: No  Breast-Feeding Experience: Yes (Breast fed 2 other babies for 6-8 weeks/had supply issues.  Those children are now 9and 6years old.)  Breast Trauma/Surgery: No  Type/Quality: Good (Mother states she was able to breatfeed baby at 1864 for 15 minutes post .)  Lactation Consultant Visits  Breast-Feedings:  (Mother resting in bed with cool compress on her head. Baby in nursery under radiant warmer for observation.)      Mother given breastfeeding handouts and LC#.

## 2022-04-12 NOTE — H&P
History & Physical    Name: Breanna Slade MRN: [id number]  SSN: [id number]    YOB: 1990  Age: 32 y.o. Sex: female        Subjective:     Estimated Date of Delivery: 22  OB History        4    Para   2    Term   2            AB   1    Living   2       SAB   1    IAB        Ectopic        Molar        Multiple        Live Births   2                Ms. Marya Craig is admitted with pregnancy at 39w3d for a primary  section. Prenatal course was complicated by persistent transverse lie. Please see prenatal records for details. Past Medical History:   Diagnosis Date    Abnormal Papanicolaou smear of cervix     , ok follow up    AR (allergic rhinitis)     Asthma     Carpal tunnel syndrome on both sides     HPV vaccine counseling     Gardasil /3    Rhesus isoimmunization affecting pregnancy     Routine Papanicolaou smear 18 neg     Past Surgical History:   Procedure Laterality Date    HX DILATION AND CURETTAGE  2021    missed AB    HX OTHER SURGICAL      dermoid cyst removed left eyebrow - age 3     Social History     Occupational History    Occupation: [employer]    Tobacco Use    Smoking status: Former Smoker     Types: Cigarettes     Quit date: 2005     Years since quittin.0    Smokeless tobacco: Never Used   Substance and Sexual Activity    Alcohol use: No    Drug use: No    Sexual activity: Yes     Partners: Male     Birth control/protection: None     Family History   Problem Relation Age of Onset    Cancer Mother         cervical CA    Hypertension Maternal Grandfather     Diabetes Maternal Grandfather     Atrial Fibrillation Maternal Grandfather     Prostate Cancer Maternal Grandfather     Stroke Maternal Grandmother     Hypertension Maternal Grandmother     High Cholesterol Maternal Grandmother        No Known Allergies  Prior to Admission medications    Medication Sig Start Date End Date Taking?  Authorizing Provider PNV Comb #2-Iron-Omega 3-FA [id number] mg cmpk Take  by mouth. Provider, Historical        Review of Systems: A comprehensive review of systems was negative except for that written in the HPI. Objective:     Vitals:  Vitals:    22 0624   BP: 122/68   Pulse: 85   Resp: 16   Temp: 98.1 °F (36.7 °C)   SpO2: 96%   Weight: 237 lb (107.5 kg)   Height: 5' 5\" (1.651 m)        Physical Exam:  Heart: Regular rate and rhythm  Lung: clear to auscultation throughout lung fields, no wheezes, no rales, no rhonchi and normal respiratory effort  Abdomen: soft, nontender  Fundus: soft and non tender  Perineum: blood absent, amniotic fluid absent  Cervical Exam: Closed/Thick/High  Lower Extremities:  - Edema 1+  Membranes:  Intact  Fetal Heart Rate: Reactive    Prenatal Labs:   Lab Results   Component Value Date/Time    Rubella, External <0.9 (non immune) 03/10/2014 12:00 AM    GrBStrep, External Negative 2022 12:00 AM    GrBStrep, External Positive 2022 12:00 AM    HBsAg, External negative 03/10/2014 12:00 AM    HIV, External <1.00; non reactive 03/10/2014 12:00 AM    Gonorrhea, External negative 03/10/2014 12:00 AM    Chlamydia, External negative 03/10/2014 12:00 AM        Assessment/Plan:     Plan: Admit for a primary  Section. Informed consent was obtained and patient stated she had no further questions. Group B Strep was positive, use of prophylactic antibiotics not indicated.     Signed By:  Luz Maria Webb MD     2022

## 2022-04-12 NOTE — ANESTHESIA PREPROCEDURE EVALUATION
Relevant Problems   ENDOCRINE   (+) Severe obesity (HCC)       Anesthetic History   No history of anesthetic complications            Review of Systems / Medical History  Patient summary reviewed, nursing notes reviewed and pertinent labs reviewed    Pulmonary            Asthma        Neuro/Psych   Within defined limits           Cardiovascular  Within defined limits                Exercise tolerance: >4 METS     GI/Hepatic/Renal  Within defined limits              Endo/Other  Within defined limits      Obesity     Other Findings              Physical Exam    Airway  Mallampati: II    Neck ROM: normal range of motion   Mouth opening: Normal     Cardiovascular  Regular rate and rhythm,  S1 and S2 normal,  no murmur, click, rub, or gallop  Rhythm: regular  Rate: normal         Dental  No notable dental hx       Pulmonary  Breath sounds clear to auscultation               Abdominal  GI exam deferred       Other Findings            Anesthetic Plan    ASA: 2  Anesthesia type: spinal            Anesthetic plan and risks discussed with: Patient

## 2022-04-12 NOTE — ANESTHESIA PROCEDURE NOTES
Spinal Block    Start time: 4/12/2022 7:43 AM  End time: 4/12/2022 7:50 AM  Performed by: Karen Anton MD  Authorized by: Karen Anton MD     Pre-procedure:   Indications: at surgeon's request and primary anesthetic  Preanesthetic Checklist: patient identified, risks and benefits discussed, anesthesia consent and timeout performed      Spinal Block:   Patient Position:  Seated  Prep Region:  Lumbar  Prep: chlorhexidine      Location:  L3-4  Technique:  Single shot        Needle:   Needle Type:  Pencan  Needle Gauge:  25 G  Attempts:  1      Events: CSF confirmed, no blood with aspiration and no paresthesia        Assessment:  Insertion:  Uncomplicated  Patient tolerance:  Patient tolerated the procedure well with no immediate complications

## 2022-04-12 NOTE — OP NOTES
Section Delivery Procedure Note         Name: Manjit Watson      Medical Record Number: [id number]      YOB: 1990     Today's Date: 2022      Preoperative Diagnosis:  delivery indicated due to transverse presentation    Postoperative Diagnosis: same    Procedure: Low Cervical Transverse Procedure(s):   SECTION    Surgeon(s): Andy Dakins, MD    Assistant:  Staff    Anesthesia: Spinal    Prophylactic Antibiotics: Ancef         Fetal Description: nava male    Birth Information:   Information for the patient's :  Renard Kocher, Male [place] [[id number]]          Umbilical Cord: normal    Placenta:  manual    Specimens: * No specimens in log *     Implants:  None           Complications:  none    EBL: See QBL    Procedure Details: The patient was prepped with alcohol and draped with ioban in the supine position with a spinal  anesthetic. Augustin catheter had been placed using sterile technique. A Pfannenstiel incision was made. The fascia was divided and then the rectus muscles were split in the midline. The peritoneum was entered well above the bladder without difficulty. The peritoneum over the lower uterine segment was incised and the bladder flap was developed. The bladder retractor was placed. The lower uterine segment was entered sharply with a single edge of the Metzenbaum scissors. Amniotomy was performed, the fluid was copius amount clear. The fetal head was close to the lower uterine segment. The infant was then delivered to chest level and the mouth and nares were suctioned. The remainder of the infant was delivered. The cord was clamped and cut and the infant handed off to the nursery staff. The placenta was delivered manually; it was normal and intact. It was not sent to pathology. The uterus was cleared of blood, fluid, clot, and membranes and involuted with IV Pitocin given by anesthesia.     The uterus was exteriorized and closed in a single, running interlocking layer of 1 Chromic. It was hemostatic. The posterior cul-de-sac was cleared of blood and fluid, then the uterus was replaced in the abdominal cavity. The gutters were irrigated with warm saline. The anterior peritoneum and rectus muscles were reapproximated in the midline with a suture of 1 chromic. The fascia was closed from left to right with 1 Stratafix in a running fashion. The subcutaneous space was irrigated and then the skin was closed with stainless steel staples. The final sponge and instrument counts were correct. The patient and infant were taken to the recovery room in good condition.     Signed By:  Ermelinda Israel MD     April 12, 2022

## 2022-04-12 NOTE — PROGRESS NOTES
0600 Patient ambulatory onto unit with steady gait accompanied by SO. Patient states she is here for  due to transverse fetal lie. IV fluid bolus initiated at this time, patient placed on EFM, and consents signed. 9216 Patient ambulatory to and from restroom with steady gait. 0715 Verbal shift change report given to JAMIE Centeno RN (oncoming nurse) by Yudi Mcneil RN (offgoing nurse). Report included the following information SBAR, Kardex, Intake/Output, MAR, Recent Results, Procedure Verification, Quality Measures and Dual Neuro Assessment.

## 2022-04-12 NOTE — L&D DELIVERY NOTE
Delivery Summary    Patient: Dodie Ponce MRN: [id number]  SSN: [id number]    YOB: 1990  Age: 32 y.o. Sex: female        Information for the patient's :  Maame Clark, Male Girish Pack [[id number]]       Labor Events:    Labor: No    Steroids: None   Cervical Ripening Date/Time:       Cervical Ripening Type: None   Antibiotics During Labor: No   Rupture Identifier:      Rupture Date/Time: 2022 8:09 AM   Rupture Type: SROM   Amniotic Fluid Volume: Moderate    Amniotic Fluid Description: Clear    Amniotic Fluid Odor: None    Induction: None       Induction Date/Time:        Indications for Induction:      Augmentation: None   Augmentation Date/Time:      Indications for Augmentation:     Labor complications: None       Additional complications:        Delivery Events:  Indications For Episiotomy:     Episiotomy: None   Perineal Laceration(s): None   Repaired:     Periurethral Laceration Location:      Repaired:     Labial Laceration Location:     Repaired:     Sulcal Laceration Location:     Repaired:     Vaginal Laceration Location:     Repaired:     Cervical Laceration Location:     Repaired:     Repair Suture: None   Number of Repair Packets:     Estimated Blood Loss (ml):  see QBL   Quantitaive Blood Loss (ml):             Delivery Date: 2022    Delivery Time: 8:10 AM   Delivery Type: , Low Transverse     Details    Trial of Labor: No   Primary/Repeat: Primary   Priority: Routine   Indications: Malposition       Sex:  Male     Gestational Age: 38w3d  Delivery Clinician:  Cat Yanez  Living Status: Living   Delivery Location: L&D L&D OR2          APGARS  One minute Five minutes Ten minutes   Skin color: 1   1        Heart rate: 2   2        Grimace: 2   2        Muscle tone: 2   2        Breathin   2        Totals: 9   9          Presentation: Vertex    Position: Left Occiput Transverse  Resuscitation Method:  Suctioning-bulb; Tactile Stimulation Meconium Stained: None      Cord Information: 3 Vessels  Complications: None  Cord around:    Delayed cord clamping? Yes  Cord clamped date/time:2022  8:11 AM  Disposition of Cord Blood: Lab    Blood Gases Sent?: No    Placenta:  Date/Time: 2022  8:12 AM  Removal: Manual Removal      Appearance: Normal;Intact     [place] Measurements:  Birth Weight: 9 lb 7.5 oz (4.295 kg)      Birth Length: 1' 9.5\" (0.546 m)      Head Circumference: 1' 2.57\" (0.37 m)      Chest Circumference: 1' 2.57\" (0.37 m)     Abdominal Girth: 1' 2.17\" (0.36 m)    Other Providers:   GREG Fong;RAUDEL MARIEE;CARO BYRNES;KAROLINE PALUMBO;ISAÍAS PATEL;JENNIFER MCCALLUM;BHAVIK ADAMS;Amanuel CROOK Lo, Obstetrician;Primary Nurse;Primary [place] Nurse; Anesthesiologist;Scrub Tech;Scrub Tech;Student Nurse;Student Nurse;Resident             Group B Strep:   Lab Results   Component Value Date/Time    GrBStrep, External Negative 2022 12:00 AM    GrBStrep, External Positive 2022 12:00 AM     Information for the patient's :  Melina Noss, Male Dessa Kocher [[id number]]     Lab Results   Component Value Date/Time    ABO/Rh(D) O POSITIVE 2022 09:13 AM    LUCITA IgG NEG 2022 09:13 AM    Bilirubin if LUCITA pos: IF DIRECT AMRIK POSITIVE, BILIRUBIN TO FOLLOW 2022 09:13 AM      No results for input(s): PCO2CB, PO2CB, HCO3I, SO2I, IBD, PTEMPI, SPECTI, PHICB, ISITE, IDEV, IALLEN in the last 72 hours.

## 2022-04-12 NOTE — PROGRESS NOTES
4/12/2022  10:39 AM    CM met with ADAMA to complete initial assessment and begin discharge planning. MOB verified and confirmed demographics. ADAMA lives with JUDY- Vitaliy Cheatham III ( [phone]), along with her 6 and 7yr old, at the address on file. ADAMA is employed and plans to take 12wks off from work. FOB is also employed and will be taking about a week off. ADAMA reports she has good family support, and feels like she has the support she needs when she returns home. ADAMA plans to breast feed baby and has pump to use at home. Dr. Francine Muniz @ [facility]  will provide follow up care for infant. ADAMA has car seat, bassinet/crib, clothing, bottles and all necessary supplies for baby. ADAMA has [employer], and will be adding baby to this policy. CM discussed process to add baby to insurance, ADAMA verbalized understanding. ADAMA is also enrolled in [facility] services and understands how to add infant to program.  Care Management Interventions  PCP Verified by CM: Yes Santy Mariano  Mode of Transport at Discharge:  Other (see comment)  Transition of Care Consult (CM Consult): Discharge Planning  Support Systems: Other Family Member(s),Spouse/Significant Other  Confirm Follow Up Transport: Family  Discharge Location  Patient Expects to be Discharged to[de-identified] Home with family assistance  Angy Laws

## 2022-04-12 NOTE — ANESTHESIA POSTPROCEDURE EVALUATION
Procedure(s):   SECTION. spinal    Anesthesia Post Evaluation      Multimodal analgesia: multimodal analgesia not used between 6 hours prior to anesthesia start to PACU discharge  Patient location during evaluation: PACU  Patient participation: complete - patient participated  Level of consciousness: awake  Pain management: adequate  Airway patency: patent  Anesthetic complications: no  Cardiovascular status: acceptable, blood pressure returned to baseline and hemodynamically stable  Respiratory status: acceptable  Hydration status: acceptable  Post anesthesia nausea and vomiting:  controlled      INITIAL Post-op Vital signs:   Vitals Value Taken Time   /63 22 1030   Temp 36.7 °C (98 °F) 22 0848   Pulse 86 22 1030   Resp 16 22 0900   SpO2 97 % 22 1040   Vitals shown include unvalidated device data.

## 2022-04-12 NOTE — PROGRESS NOTES
0715: Verbal shift change report given to Gloria Pollard RN (oncoming nurse) by Ingrid Posada RN (offgoing nurse). Report included the following information SBAR, Intake/Output, MAR and Recent Results. 0740: Patient ambulatory into L&D OR2 for scheduled primary  with this RN and Mone Villarreal Student RN. Steady gait noted. 65: Viable, infant male born via primary  r/t suspected transverse presentation. Infant dried and stimulated by Hammad Amado MD and Natan Garcia. Infant passed to [address] [facility] [address] RN for progression of care. Metal staples utilized by Hammad Amado MD. Sandra Eduardo [id number]    Delivery QBL: 1610mL  Jason HINES informed by this RN via telephone at [phone], no additional orders received. 1035: TXA administration per TORB from Hammad Amado MD, see MAR.    2 hour QBL: 178mL  Total QBL: 1788mL    1126: TRANSFER - OUT REPORT:    Verbal report given to Chandrika Acharya RN(name) on [address] [place] [address]  being transferred to MIU (unit) for routine progression of care       Report consisted of patients Situation, Background, Assessment and   Recommendations(SBAR). Information from the following report(s) SBAR, Intake/Output, MAR and Recent Results OR summary was reviewed with the receiving nurse. Lines:   Peripheral IV 22 Posterior;Right Hand (Active)   Site Assessment Clean, dry, & intact 22 0845   Phlebitis Assessment 0 22 0845   Infiltration Assessment 0 22 0845   Dressing Status Clean, dry, & intact 22 0845   Dressing Type Transparent;Tape 22 0845   Hub Color/Line Status Pink; Infusing 22 0845   Action Taken Open ports on tubing capped 22 0725   Alcohol Cap Used Yes 22 0845        Opportunity for questions and clarification was provided.       Patient transported with:   Registered Nurse Douglas Rome RN via stretcher

## 2022-04-12 NOTE — ROUTINE PROCESS
Bedside and verbal shift change report given to oncoming nurse, as assigned, by offgoing nurse, Dilcia Weathers RN. Report included SBAR, Kardex, I&Os, Recent Results, Procedures, MAR, and changes in patient status. Oncoming nurse and patient given opportunity for questions.

## 2022-04-13 LAB
BASOPHILS # BLD: 0 K/UL (ref 0–0.1)
BASOPHILS NFR BLD: 0 % (ref 0–1)
DIFFERENTIAL METHOD BLD: ABNORMAL
EOSINOPHIL # BLD: 0.1 K/UL (ref 0–0.4)
EOSINOPHIL NFR BLD: 1 % (ref 0–7)
ERYTHROCYTE [DISTWIDTH] IN BLOOD BY AUTOMATED COUNT: 13.4 % (ref 11.5–14.5)
HCT VFR BLD AUTO: 26.4 % (ref 35–47)
HGB BLD-MCNC: 8.9 G/DL (ref 11.5–16)
IMM GRANULOCYTES # BLD AUTO: 0.1 K/UL (ref 0–0.04)
IMM GRANULOCYTES NFR BLD AUTO: 1 % (ref 0–0.5)
LYMPHOCYTES # BLD: 2.1 K/UL (ref 0.8–3.5)
LYMPHOCYTES NFR BLD: 16 % (ref 12–49)
MCH RBC QN AUTO: 30.8 PG (ref 26–34)
MCHC RBC AUTO-ENTMCNC: 33.7 G/DL (ref 30–36.5)
MCV RBC AUTO: 91.3 FL (ref 80–99)
MONOCYTES # BLD: 0.9 K/UL (ref 0–1)
MONOCYTES NFR BLD: 7 % (ref 5–13)
NEUTS SEG # BLD: 9.6 K/UL (ref 1.8–8)
NEUTS SEG NFR BLD: 75 % (ref 32–75)
NRBC # BLD: 0 K/UL (ref 0–0.01)
NRBC BLD-RTO: 0 PER 100 WBC
PLATELET # BLD AUTO: 207 K/UL (ref 150–400)
PMV BLD AUTO: 10.3 FL (ref 8.9–12.9)
RBC # BLD AUTO: 2.89 M/UL (ref 3.8–5.2)
WBC # BLD AUTO: 12.8 K/UL (ref 3.6–11)

## 2022-04-13 PROCEDURE — 85461 HEMOGLOBIN FETAL: CPT

## 2022-04-13 PROCEDURE — 36415 COLL VENOUS BLD VENIPUNCTURE: CPT

## 2022-04-13 PROCEDURE — 86900 BLOOD TYPING SEROLOGIC ABO: CPT

## 2022-04-13 PROCEDURE — 74011250636 HC RX REV CODE- 250/636: Performed by: OBSTETRICS & GYNECOLOGY

## 2022-04-13 PROCEDURE — 85025 COMPLETE CBC W/AUTO DIFF WBC: CPT

## 2022-04-13 PROCEDURE — 74011250636 HC RX REV CODE- 250/636: Performed by: ANESTHESIOLOGY

## 2022-04-13 PROCEDURE — 74011250637 HC RX REV CODE- 250/637: Performed by: OBSTETRICS & GYNECOLOGY

## 2022-04-13 PROCEDURE — 74011000250 HC RX REV CODE- 250: Performed by: OBSTETRICS & GYNECOLOGY

## 2022-04-13 PROCEDURE — 65270000029 HC RM PRIVATE

## 2022-04-13 RX ORDER — IBUPROFEN 800 MG/1
800 TABLET ORAL EVERY 8 HOURS
Status: DISCONTINUED | OUTPATIENT
Start: 2022-04-13 | End: 2022-04-14 | Stop reason: HOSPADM

## 2022-04-13 RX ADMIN — SIMETHICONE 80 MG: 80 TABLET, CHEWABLE ORAL at 08:33

## 2022-04-13 RX ADMIN — HUMAN RHO(D) IMMUNE GLOBULIN 0.3 MG: 300 INJECTION, SOLUTION INTRAMUSCULAR at 17:15

## 2022-04-13 RX ADMIN — IBUPROFEN 800 MG: 800 TABLET, FILM COATED ORAL at 17:15

## 2022-04-13 RX ADMIN — HYDROCODONE BITARTRATE AND ACETAMINOPHEN 1 TABLET: 10; 325 TABLET ORAL at 13:09

## 2022-04-13 RX ADMIN — SODIUM CHLORIDE, PRESERVATIVE FREE 10 ML: 5 INJECTION INTRAVENOUS at 00:32

## 2022-04-13 RX ADMIN — HYDROCODONE BITARTRATE AND ACETAMINOPHEN 1 TABLET: 10; 325 TABLET ORAL at 17:14

## 2022-04-13 RX ADMIN — HYDROCODONE BITARTRATE AND ACETAMINOPHEN 1 TABLET: 10; 325 TABLET ORAL at 08:34

## 2022-04-13 RX ADMIN — SIMETHICONE 80 MG: 80 TABLET, CHEWABLE ORAL at 00:30

## 2022-04-13 RX ADMIN — HYDROCODONE BITARTRATE AND ACETAMINOPHEN 1 TABLET: 10; 325 TABLET ORAL at 21:38

## 2022-04-13 RX ADMIN — SIMETHICONE 80 MG: 80 TABLET, CHEWABLE ORAL at 20:02

## 2022-04-13 RX ADMIN — IBUPROFEN 800 MG: 800 TABLET, FILM COATED ORAL at 08:33

## 2022-04-13 RX ADMIN — KETOROLAC TROMETHAMINE 30 MG: 30 INJECTION, SOLUTION INTRAMUSCULAR at 00:30

## 2022-04-13 NOTE — LACTATION NOTE
This note was copied from a baby's chart. Baby was rooting during visit. Baby was put to breast and breast fed vigorously. Mother states baby for circumcision later today - discussed doing skin to skin and she may need to hand express 10-20 drops of colostrum if baby is sleepy post procedure. Hand Expression Education:  Mom taught how to manually hand express her colostrum. Emphasized the importance of providing infant with valuable colostrum as infant rests skin to skin at breast.  Aware to avoid extended periods of non-feeding. Aware to offer 10-20+ drops of colostrum every 2-3 hours until infant is latching and nursing effectively. Taught the rationale behind this low tech but highly effective evidence based practice. Reviewed breastfeeding basics:  Supply and demand,  stomach size, early  Feeding cues, skin to skin, positioning and baby led latch-on, assymetrical latch with signs of good, deep latch vs shallow, feeding frequency and duration, and log sheet for tracking infant feedings and output. Breastfeeding Booklet and Warm line information given. Discussed typical  weight loss and the importance of infant weight checks with pediatrician 1-2 post discharge. Mother reports history of low breast milk supply. She felt breast changes this pregnancy and leaked colostrum. Instructed mother to breastfeed baby early and frequently (8-12 times in 24 hours) to help stimulate her breast milk supply . Mother to keep a log of baby's intake/output, look for early feeding cues, listen for baby to swallow during feedings, monitor baby's weight/keep log at pediatric visits Reviewed foods/drinks to help stimulate milk production. Instructed mother to call lactation services and her healthcare provider if she notices a decrease in her breast milk supply.      Mother will successfully establish breastfeeding by feeding in response to early feeding cues   or wake every 3h, will obtain deep latch, and will keep log of feedings/output. Taught to BF at hunger cues and or q 2-3 hrs and to offer 10-20 drops of hand expressed colostrum at any non-feeds.       Breast Assessment  Left Breast: Small ,Medium,Wide angle  Left Nipple: Everted,Intact  Right Breast: Medium,Wide angle  Right Nipple: Everted,Intact  Breast- Feeding Assessment  Attends Breast-Feeding Classes: No  Breast-Feeding Experience: Yes  Breast Trauma/Surgery: No  Type/Quality: Good (Per mother)  Lactation Consultant Visits  Breast-Feedings: Good  (Baby latched on well to right breast in cradle hold and nursed vigorously.)  Mother/Infant Observation  Mother Observation: Alignment,Holds breast,Breast comfortable,Close hold  Infant Observation: Audible swallows,Lips flanged, upper,Opens mouth,Feeding cues,Frenulum checked,Rhythmic suck,Latches nipple and aereolae,Lips flanged, lower  LATCH Documentation  Latch: Grasps breast, tongue down, lips flanged, rhythmic sucking  Audible Swallowing: A few with stimulation  Type of Nipple: Everted (after stimulation)  Comfort (Breast/Nipple): Soft/non-tender  Hold (Positioning): No assist from staff, mother able to position/hold infant  LATCH Score: 9

## 2022-04-13 NOTE — ROUTINE PROCESS
Bedside and verbal shift change report given to oncoming nurse, as assigned, by offgoing nurse, Althea Toro RN. Report included SBAR, Kardex, I&Os, Recent Results, Procedures, MAR, and changes in patient status. Oncoming nurse and patient given opportunity for questions.

## 2022-04-13 NOTE — PROGRESS NOTES
Post-Operative Day 1 Progress Note    Patient now post-op day 1 following  delivery. No significant complaints. Pain controlled on medication. Catheter out this morning, normal lochia. Vitals:  Patient Vitals for the past 8 hrs:   BP Temp Pulse Resp SpO2   22 0645 (!) 101/57 98.3 °F (36.8 °C) 87 16 100 %   22 0404 116/85 98.3 °F (36.8 °C) 92 17 97 %     Temp (24hrs), Av.2 °F (36.8 °C), Min:97.9 °F (36.6 °C), Max:98.6 °F (37 °C)      Vital signs stable, afebrile. Exam:  Patient without distress. Abdomen soft, flat, non-tender with fundus firm. Incision  clean, dry, and intact without erythema. Lower extremities are negative for swelling, cords or tenderness. .    Labs:   Recent Results (from the past 24 hour(s))   RH IMMUNE GLOBULIN EVAL-LAB ORDER    Collection Time: 22  3:55 AM   Result Value Ref Range    ABO/Rh(D) O NEGATIVE     Fetal screen NEG     Unit number [id number]     Blood component type RH IMMUNE GLOBULIN     Unit division 00     Status of unit ALLOCATED    CBC WITH AUTOMATED DIFF    Collection Time: 22  3:55 AM   Result Value Ref Range    WBC 12.8 (H) 3.6 - 11.0 K/uL    RBC 2.89 (L) 3.80 - 5.20 M/uL    HGB 8.9 (L) 11.5 - 16.0 g/dL    HCT 26.4 (L) 35.0 - 47.0 %    MCV 91.3 80.0 - 99.0 FL    MCH 30.8 26.0 - 34.0 PG    MCHC 33.7 30.0 - 36.5 g/dL    RDW 13.4 11.5 - 14.5 %    PLATELET 650 468 - 765 K/uL    MPV 10.3 8.9 - 12.9 FL    NRBC 0.0 0  WBC    ABSOLUTE NRBC 0.00 0.00 - 0.01 K/uL    NEUTROPHILS 75 32 - 75 %    LYMPHOCYTES 16 12 - 49 %    MONOCYTES 7 5 - 13 %    EOSINOPHILS 1 0 - 7 %    BASOPHILS 0 0 - 1 %    IMMATURE GRANULOCYTES 1 (H) 0.0 - 0.5 %    ABS. NEUTROPHILS 9.6 (H) 1.8 - 8.0 K/UL    ABS. LYMPHOCYTES 2.1 0.8 - 3.5 K/UL    ABS. MONOCYTES 0.9 0.0 - 1.0 K/UL    ABS. EOSINOPHILS 0.1 0.0 - 0.4 K/UL    ABS. BASOPHILS 0.0 0.0 - 0.1 K/UL    ABS. IMM.  GRANS. 0.1 (H) 0.00 - 0.04 K/UL    DF AUTOMATED         Assessment and Plan:  Patient doing well post- day 1. Continue routine post-op care and maternal education.

## 2022-04-13 NOTE — ROUTINE PROCESS
Bedside and verbal shift change report given to oncoming nurse, as assigned, by offgoing nurse, Danilo Franks RN. Report included SBAR, Kardex, I&Os, Recent Results, Procedures, MAR, and changes in patient status. Oncoming nurse and patient given opportunity for questions.

## 2022-04-14 VITALS
BODY MASS INDEX: 39.49 KG/M2 | WEIGHT: 237 LBS | SYSTOLIC BLOOD PRESSURE: 126 MMHG | DIASTOLIC BLOOD PRESSURE: 69 MMHG | HEIGHT: 65 IN | TEMPERATURE: 98.6 F | RESPIRATION RATE: 20 BRPM | OXYGEN SATURATION: 100 % | HEART RATE: 80 BPM

## 2022-04-14 LAB
ABO + RH BLD: NORMAL
BLD PROD TYP BPU: NORMAL
BPU ID: NORMAL
FETAL SCREEN,FMHS: NORMAL
STATUS OF UNIT,%ST: NORMAL
UNIT DIVISION, %UDIV: 0

## 2022-04-14 PROCEDURE — 74011250637 HC RX REV CODE- 250/637: Performed by: OBSTETRICS & GYNECOLOGY

## 2022-04-14 RX ORDER — HYDROCODONE BITARTRATE AND ACETAMINOPHEN 7.5; 325 MG/1; MG/1
1 TABLET ORAL
Qty: 24 TABLET | Refills: 0 | Status: SHIPPED | OUTPATIENT
Start: 2022-04-14 | End: 2022-04-21

## 2022-04-14 RX ADMIN — IBUPROFEN 800 MG: 800 TABLET, FILM COATED ORAL at 01:44

## 2022-04-14 RX ADMIN — HYDROCODONE BITARTRATE AND ACETAMINOPHEN 1 TABLET: 10; 325 TABLET ORAL at 10:36

## 2022-04-14 RX ADMIN — SIMETHICONE 80 MG: 80 TABLET, CHEWABLE ORAL at 10:36

## 2022-04-14 RX ADMIN — IBUPROFEN 800 MG: 800 TABLET, FILM COATED ORAL at 10:36

## 2022-04-14 RX ADMIN — MAGNESIUM HYDROXIDE 30 ML: 400 SUSPENSION ORAL at 10:36

## 2022-04-14 RX ADMIN — HYDROCODONE BITARTRATE AND ACETAMINOPHEN 1 TABLET: 10; 325 TABLET ORAL at 01:44

## 2022-04-14 NOTE — PROGRESS NOTES
Post-Operative Day 2 Progress/Discharge Note    Patient now post-op day 2 following  delivery. No significant complaints. Pain controlled on medication. Voiding without difficulty, normal lochia. Requests release today. Vitals:  No data found. Temp (24hrs), Av.5 °F (36.9 °C), Min:98.3 °F (36.8 °C), Max:98.6 °F (37 °C)      Vital signs stable, afebrile. Exam:  Patient without distress. Abdomen soft, flat, non-tender with fundus firm. Incision  clean, dry, and intact without erythema. Lower extremities are negative for swelling, cords or tenderness. Labs: No results found for this or any previous visit (from the past 24 hour(s)). Assessment and Plan:  Patient has to date had an uncomplicated post- course. Continue routine post-op care and maternal education. Plan discharge for today--see discharge summary.

## 2022-04-14 NOTE — DISCHARGE SUMMARY
Obstetrical Discharge Summary     Name: Hari Pérez MRN: [id number]  SSN: [id number]    YOB: 1990  Age: 32 y.o. Sex: female      Admit Date: 2022    Discharge Date: 2022     Admitting Physician: Byron López MD     Attending Physician:  Victoria Casey MD     Admission Diagnoses:  delivery indicated due to breech presentation [O32.1XX0]    Discharge Diagnoses:   Information for the patient's :  Duval Sindy, Male Flaquito Levi [[id number]]   Delivery of a 9 lb 7.5 oz (4.295 kg) male infant via , Low Transverse on 2022 at 8:10 AM  by Katerin Bowling. Apgars were 9  and 9 . Additional Diagnoses:   Hospital Problems  Date Reviewed: 2017          Codes Class Noted POA     delivery indicated due to breech presentation ICD-10-CM: O32. 1XX0  ICD-9-CM: 652.21, 669.71  2022 Unknown             Lab Results   Component Value Date/Time    Rubella, External Immune 10/01/2021 12:00 AM    GrBStrep, External Negative 2022 12:00 AM    GrBStrep, External Positive 2022 12:00 AM       Immunization(s):   Immunization History   Administered Date(s) Administered    Human Papillomavirus 2007    MMR 10/21/2014    Rho(D) Immune Globulin - IM 10/20/2014, 2021, 2022, 2022    Tdap 2014, 2022        Hospital Course: Normal hospital course following the delivery. The patient was released to her home in good condition. Patient Instructions:     Reference my discharge instructions. I spent 10 minutes discharging the patient in face to face contact. Follow-up Appointments   Procedures    FOLLOW UP VISIT Appointment in: Two Weeks     Standing Status:   Standing     Number of Occurrences:   1     Order Specific Question:   Appointment in     Answer:    Two Weeks        Signed By:  Byron López MD     2022

## 2022-04-14 NOTE — LACTATION NOTE
This note was copied from a baby's chart. Mother and baby for discharge today. Mother states baby has been latching on and breastfeeding well. Baby was in nursery at time of visit having circumcision and baby breast fed prior to procedure. Reviewed breastfeeding basics:  Supply and demand, breastfeed baby 8-1 times in 24 hr., feed baby on demand,   stomach size, early  Feeding cues, skin to skin, positioning and baby led latch-on, assymetrical latch with signs of good, deep latch vs shallow, feeding frequency and duration, and log sheet for tracking infant feedings and output. Breastfeeding Booklet and Warm line information given. Discussed typical  weight loss and the importance of infant weight checks with pediatrician 1-2 post discharge. Discussed eating a healthy diet. Instructed mother to eat a variety of foods in order to get a well balanced diet. She should consume an extra 500 calories per day (more than her non-pregnant requirement.) These extra calories will help provide energy needed for optimal breast milk production. Mother also encouraged to \"drink to thirst\" and it is recommended that she drink fluids such as water, fruit/vegetable juice. Nutritious snacks should be available so that she can eat throughout the day to help satisfy her hunger and maintain a good milk supply. Discussed what to do if she gets engorged or nipples become sore. Engorgement Care Guidelines:  Reviewed how milk is made and normal phases of milk production. Taught care of engorged breasts - frequent breastfeeding encouraged, cool packs and motrin as tolerated. Anticipatory guidance shared.      Care for sore/tender nipples discussed:  ways to improve positioning and latch practiced and discussed, hand express colostrum after feedings and let air dry, light application of lanolin, hydrogel pads, seek comfortable laid back feeding position, start feedings on least sore side first.    Reviewed pumping Rigoberto Toney and preparation of expressed breast milk for baby. Reviewed symptoms of mastitis and to call OB doctor if she notices them. Mother will successfully establish breastfeeding by feeding in response to early feeding cues   or wake every 3h, will obtain deep latch, and will keep log of feedings/output. Taught to BF at hunger cues and or q 2-3 hrs and to offer 10-20 drops of hand expressed colostrum at any non-feeds. Breast Assessment  Left Breast: Small ,Medium,Wide angle  Left Nipple: Everted,Intact  Right Breast: Medium,Wide angle  Right Nipple: Everted,Intact  Breast- Feeding Assessment  Attends Breast-Feeding Classes: No  Breast-Feeding Experience: Yes  Breast Trauma/Surgery: No  Type/Quality: Good  Lactation Consultant Visits  Breast-Feedings: Good  (Mother and baby for discharge. Baby was in nursery for circumcision. Mother states baby breast fed well for 25 min. prior to circumcision.)    Chart shows numerous feedings, void, stool WNL. Discussed importance of monitoring outputs and feedings on first week of life. Discussed ways to tell if baby is  getting enough breast milk, ie  voids and stools, change in color of stool, and return to birth wt within 2 weeks. Follow up with pediatrician visit for weight check in 1-2 days (per AAP guidelines.)  Encouraged to call Warm Line  [phone]  for any questions/problems that arise.  Mother also given breastfeeding support group dates and times for any future needs

## 2022-04-14 NOTE — ROUTINE PROCESS
Bedside and verbal shift change report given to oncoming nurse, as assigned, by offgoing nurse, Chris Catalan RN. Report included SBAR, Kardex, I&Os, Recent Results, Procedures, MAR, and changes in patient status. Oncoming nurse and patient given opportunity for questions.

## 2022-04-18 ENCOUNTER — OFFICE VISIT (OUTPATIENT)
Dept: OBGYN CLINIC | Age: 32
End: 2022-04-18
Payer: COMMERCIAL

## 2022-04-18 VITALS — DIASTOLIC BLOOD PRESSURE: 70 MMHG | BODY MASS INDEX: 36.88 KG/M2 | WEIGHT: 221.6 LBS | SYSTOLIC BLOOD PRESSURE: 122 MMHG

## 2022-04-18 DIAGNOSIS — R60.0 PEDAL EDEMA: ICD-10-CM

## 2022-04-18 DIAGNOSIS — B35.4 TINEA CORPORIS: Primary | ICD-10-CM

## 2022-04-18 PROCEDURE — 99213 OFFICE O/P EST LOW 20 MIN: CPT | Performed by: OBSTETRICS & GYNECOLOGY

## 2022-04-18 RX ORDER — CICLOPIROX OLAMINE 7.7 MG/G
CREAM TOPICAL 2 TIMES DAILY
Qty: 30 G | Refills: 1 | Status: SHIPPED | OUTPATIENT
Start: 2022-04-18

## 2022-04-18 NOTE — PROGRESS NOTES
Problem Visit-Complete    Chief Complaint   Swelling      HPI  Sheba Oliveros is a 32 y.o. female who presents for the evaluation of Swelling, headaches and rash. Pt had Primary  on 22. Pt says that the headaches and swelling have been going on since 2022. She did mention the swelling to the nurse before she got discharged and they stated it was normal per pt. She states the headaches are at a 3 on pain scale, she has not taken anything for the pain. The swelling is only in her feet and is associated with pain in her left ankle. Patient's last menstrual period was 2021.      Past Medical History:   Diagnosis Date    Abnormal Papanicolaou smear of cervix     , ok follow up    AR (allergic rhinitis)     Asthma     Carpal tunnel syndrome on both sides     HPV vaccine counseling     Gardasil /3    Rhesus isoimmunization affecting pregnancy     Routine Papanicolaou smear 18 neg     Past Surgical History:   Procedure Laterality Date    HX DILATION AND CURETTAGE  2021    missed AB    HX OTHER SURGICAL      dermoid cyst removed left eyebrow - age 3     Social History     Occupational History    Occupation: [employer]    Tobacco Use    Smoking status: Former Smoker     Types: Cigarettes     Quit date: 2005     Years since quittin.0    Smokeless tobacco: Never Used   Substance and Sexual Activity    Alcohol use: No    Drug use: No    Sexual activity: Yes     Partners: Male     Birth control/protection: None     Family History   Problem Relation Age of Onset    Cancer Mother         cervical CA    Hypertension Maternal Grandfather     Diabetes Maternal Grandfather     Atrial Fibrillation Maternal Grandfather     Prostate Cancer Maternal Grandfather     Stroke Maternal Grandmother     Hypertension Maternal Grandmother     High Cholesterol Maternal Grandmother        No Known Allergies  Prior to Admission medications    Medication Sig Start Date End Date Taking? Authorizing Provider   HYDROcodone-acetaminophen (NORCO) 7.5-325 mg per tablet Take 1 Tablet by mouth every six (6) hours as needed for Pain for up to 7 days. Max Daily Amount: 4 Tablets. 4/14/22 4/21/22  Natalie Padilla MD   PNV Comb #2-Iron-Omega 3-FA [id number] mg cmpk Take  by mouth.     Provider, Historical        Review of Systems: History obtained from the patient  Constitutional: negative for weight loss, fever, night sweats  HEENT: negative for hearing loss, earache, congestion, snoring, sorethroat  CV: negative for chest pain, palpitations, edema  Resp: negative for cough, shortness of breath, wheezing  Breast: negative for breast lumps, nipple discharge, galactorrhea  GI: negative for change in bowel habits, abdominal pain, black or bloody stools  : negative for frequency, dysuria, hematuria, vaginal discharge  MSK: negative for back pain, joint pain, muscle pain  Skin: negative for itching, rash, hives  Neuro: negative for dizziness, headache, confusion, weakness  Psych: negative for anxiety, depression, change in mood  Heme/lymph: negative for bleeding, bruising, pallor    Objective:  Visit Vitals  /70   Wt 221 lb 9.6 oz (100.5 kg)   LMP 07/03/2021   BMI 36.88 kg/m²       Physical Exam:     Constitutional  · Appearance: well-nourished, well developed, alert, in no acute distress    HENT  · Head and Face: appears normal    Gastrointestinal  · Abdominal Examination: rash in panniculus is red, irritated superficial distributed all along the area above her incision, otherwise abdomen non-tender to palpation, normal bowel sounds, no masses present  · Liver and spleen: no hepatomegaly present, spleen not palpable  · Hernias: no hernias identified    Skin  · General Inspection: no rash, no lesions identified    Lower extremities  · Edema 1-2+ bilaterally    Neurologic/Psychiatric  · Mental Status:  · Orientation: grossly oriented to person, place and time  · Mood and Affect: mood normal, affect appropriate    Assessment:  Tinea of abdomen above incision, which has no sign of infection. Edema is normal post delivery/C/S. Headaches are mild and no other sign of PIH. Plan:   Rx Loprox topical for incision. RTO prn if symptoms persist or worsen. Instructions given to pt. Handouts given to pt.

## 2022-04-20 ENCOUNTER — TELEPHONE (OUTPATIENT)
Dept: OBGYN CLINIC | Age: 32
End: 2022-04-20

## 2022-04-20 NOTE — TELEPHONE ENCOUNTER
Message left at 2:29pm      32year old patient delivered on 2022  delivery      [employer] left a message about the patients disability claim and to call the [employer] back      This nurse called [employer] back at  the number left on the voice mail     [employer] not able to pull up the patient account and asked if this nurse can send the patient a message to call them with the claim number      My chart message sent to the patient      [employer] was advised of need to sent a faxed request to the office and fax number provided

## 2022-05-18 NOTE — PROGRESS NOTES
Postpartum evaluation    Jan Quinones is a 28 y.o. female who presents for a postpartum exam.     She is now six weeks post primary  section. Her baby is doing well. She has had no menses since delivery. She has had the following significant problems since her delivery: none    The patient is both breast feeding and bottle feeding without difficulty. The patient would like to use birth control. She is currently taking: no medications. She is due for her next AE in 12 months.      Visit Vitals  /74   Wt 211 lb 9.6 oz (96 kg)   LMP 2021   BMI 35.21 kg/m²       PHYSICAL EXAMINATION    Constitutional  · Appearance: well-nourished, well developed, alert, in no acute distress    HENT  · Head and Face: appears normal    Neck  · Inspection/Palpation: normal appearance, no masses or tenderness  · Lymph Nodes: no lymphadenopathy present  · Thyroid: gland size normal, nontender, no nodules or masses present on palpation    Breasts  · Inspection of Breasts: breasts symmetrical, no skin changes, no discharge present, nipple appearance normal, no skin retraction present  · Palpation of Breasts and Axillae: no masses present on palpation, no breast tenderness  · Axillary Lymph Nodes: no lymphadenopathy present    Gastrointestinal  · Abdominal Examination: abdomen non-tender to palpation, normal bowel sounds, no masses present  · Liver and spleen: no hepatomegaly present, spleen not palpable  · Hernias: no hernias identified    Genitourinary  · External Genitalia: normal appearance for age, no discharge present, no tenderness present, no inflammatory lesions present, no masses present, no atrophy present  · Vagina: normal vaginal vault without central or paravaginal defects, no discharge present, no inflammatory lesions present, no masses present  · Bladder: non-tender to palpation  · Urethra: appears normal  · Cervix: normal   · Uterus: normal size, shape and consistency  · Adnexa: no adnexal tenderness present, no adnexal masses present  · Perineum: perineum within normal limits, no evidence of trauma, no rashes or skin lesions present  · Anus: anus within normal limits, no hemorrhoids present  · Inguinal Lymph Nodes: no lymphadenopathy present    Skin  · General Inspection: no rash, no lesions identified    Neurologic/Psychiatric  · Mental Status:  · Orientation: grossly oriented to person, place and time  · Mood and Affect: mood normal, affect appropriate    Assessment:  Normal postpartum check    Plan:  RTO for AE.   Rx for contraception: POP

## 2022-05-24 ENCOUNTER — OFFICE VISIT (OUTPATIENT)
Dept: OBGYN CLINIC | Age: 32
End: 2022-05-24
Payer: COMMERCIAL

## 2022-05-24 VITALS — WEIGHT: 211.6 LBS | BODY MASS INDEX: 35.21 KG/M2 | SYSTOLIC BLOOD PRESSURE: 116 MMHG | DIASTOLIC BLOOD PRESSURE: 74 MMHG

## 2022-05-24 PROCEDURE — 0503F POSTPARTUM CARE VISIT: CPT | Performed by: OBSTETRICS & GYNECOLOGY

## 2022-05-24 RX ORDER — ACETAMINOPHEN AND CODEINE PHOSPHATE 120; 12 MG/5ML; MG/5ML
1 SOLUTION ORAL DAILY
Qty: 3 DOSE PACK | Refills: 3 | Status: SHIPPED | OUTPATIENT
Start: 2022-05-24

## 2023-05-08 RX ORDER — ACETAMINOPHEN AND CODEINE PHOSPHATE 120; 12 MG/5ML; MG/5ML
SOLUTION ORAL
Qty: 84 TABLET | Refills: 3 | OUTPATIENT
Start: 2023-05-08

## 2023-08-03 NOTE — PROGRESS NOTES
Dariana Duran is a 35 y.o. female presents for a new pregnancy visit. No chief complaint on file. No LMP recorded. Last Pap: see report obtained 2 year(s) ago. LMP history:  The date of her LMP is 76 certain. Her last menstrual period was normal and lasted for 4 to 5 days. A urine pregnancy test was positive 4 weeks ago. She was not on the pill at conception. Based on her LMP, her EDC is 3/10/2024 and her EGA is 8 weeks,5 days. Her menstrual cycles are regular and occur approximately every 28 days and range from 3 to 5 days. The last menses lasted  the usual number of days. Ultrasound data:  She had an ultrasound done by Patricia Clinton today which revealed a viable potter pregnancy with a gestational age of *** weeks and *** days giving an EDC of ***. Pregnancy symptoms:    Since her LMP she has experienced urinary frequency, breast tenderness, and nausea. She has been vomiting over the last few weeks. Associated signs and symptoms which she denies: dysuria, discharge, vaginal bleeding. She states she has gained weight:  Approximately 5 pounds over the last few weeks. Relevant past pregnancy history:   She has the following pregnancy history: ***    She has no history of  delivery. Relevant past medical history:(relevant to this pregnancy): noncontributory. Her occupation is:  . 1. Have you been to the ER, urgent care clinic, or hospitalized since your last visit? No    2. Have you seen or consulted any other health care providers outside of the [address] [place] [address] since your last visit? No    Examination chaperoned by Hartford Skiff, LPN.

## 2023-08-04 ENCOUNTER — ROUTINE PRENATAL (OUTPATIENT)
Age: 33
End: 2023-08-04
Payer: COMMERCIAL

## 2023-08-04 VITALS — SYSTOLIC BLOOD PRESSURE: 122 MMHG | DIASTOLIC BLOOD PRESSURE: 78 MMHG | WEIGHT: 220.6 LBS | BODY MASS INDEX: 36.71 KG/M2

## 2023-08-04 DIAGNOSIS — Z34.80 ENCOUNTER FOR SUPERVISION OF OTHER NORMAL PREGNANCY, UNSPECIFIED TRIMESTER: Primary | ICD-10-CM

## 2023-08-04 DIAGNOSIS — Z34.90 ENCOUNTER FOR PRENATAL CARE: ICD-10-CM

## 2023-08-04 PROCEDURE — 76817 TRANSVAGINAL US OBSTETRIC: CPT | Performed by: OBSTETRICS & GYNECOLOGY

## 2023-08-04 PROCEDURE — 0500F INITIAL PRENATAL CARE VISIT: CPT | Performed by: OBSTETRICS & GYNECOLOGY

## 2023-08-04 NOTE — PROGRESS NOTES
Current pregnancy history:    Lloyd Gonzalez is a [id number],  35 y.o. female White (non-) Patient's last menstrual period was 2023. She presents for the evaluation of amenorrhea and a positive pregnancy test.    Per nursing Note:  Last Pap: see report obtained 2 year(s) ago. LMP history:  The date of her LMP is 96 certain. Her last menstrual period was normal and lasted for 4 to 5 days. A urine pregnancy test was positive 4 weeks ago. She was not on the pill at conception. Based on her LMP, her EDC is 3/10/2024 and her EGA is 8 weeks, 5 days. Her menstrual cycles are regular and occur approximately every 28 days and range from 3 to 5 days. The last menses lasted  the usual number of days. Ultrasound data:  She had an ultrasound done by Kayleigh Kolb today which revealed a viable potter pregnancy with a gestational age of 11 weeks and 6 days giving an EDC of 3/10/2024. Pregnancy symptoms:     Since her LMP she has experienced urinary frequency, breast tenderness, and nausea. She has been vomiting over the last few weeks. Associated signs and symptoms which she denies: dysuria, discharge, vaginal bleeding. She states she has gained weight:  Approximately 5 pounds over the last few weeks. Relevant past pregnancy history:              She has the following pregnancy history: 2 's and one C/S               She has no history of  delivery. Relevant past medical history:(relevant to this pregnancy): noncontributory. Her occupation is:  . Substance history: negative for alcohol, tobacco and street drugs. Positive for nothing. Exposure history: There is/are no indoor cat/s in the home. The patient was instructed to not change the cat litter. She has had chicken pox or the vaccine in the past.   Patient denies issues with domestic violence.      Genetic Screening/Teratology Counseling: (Includes patient, baby's father, or

## 2023-08-06 LAB — BACTERIA UR CULT: NO GROWTH

## 2023-08-07 LAB
A VAGINAE DNA VAG QL NAA+PROBE: NORMAL SCORE
BVAB2 DNA VAG QL NAA+PROBE: NORMAL SCORE
C ALBICANS DNA VAG QL NAA+PROBE: NEGATIVE
C GLABRATA DNA VAG QL NAA+PROBE: NEGATIVE
C TRACH DNA VAG QL NAA+PROBE: NEGATIVE
MEGA1 DNA VAG QL NAA+PROBE: NORMAL SCORE
N GONORRHOEA DNA VAG QL NAA+PROBE: NEGATIVE
T VAGINALIS DNA VAG QL NAA+PROBE: NEGATIVE

## 2023-08-22 SDOH — ECONOMIC STABILITY: TRANSPORTATION INSECURITY
IN THE PAST 12 MONTHS, HAS LACK OF TRANSPORTATION KEPT YOU FROM MEETINGS, WORK, OR FROM GETTING THINGS NEEDED FOR DAILY LIVING?: PATIENT DECLINED

## 2023-08-22 SDOH — ECONOMIC STABILITY: HOUSING INSECURITY
IN THE LAST 12 MONTHS, WAS THERE A TIME WHEN YOU DID NOT HAVE A STEADY PLACE TO SLEEP OR SLEPT IN A SHELTER (INCLUDING NOW)?: PATIENT REFUSED

## 2023-08-22 SDOH — ECONOMIC STABILITY: INCOME INSECURITY: HOW HARD IS IT FOR YOU TO PAY FOR THE VERY BASICS LIKE FOOD, HOUSING, MEDICAL CARE, AND HEATING?: PATIENT DECLINED

## 2023-08-22 SDOH — ECONOMIC STABILITY: FOOD INSECURITY: WITHIN THE PAST 12 MONTHS, YOU WORRIED THAT YOUR FOOD WOULD RUN OUT BEFORE YOU GOT MONEY TO BUY MORE.: PATIENT DECLINED

## 2023-08-22 SDOH — ECONOMIC STABILITY: FOOD INSECURITY: WITHIN THE PAST 12 MONTHS, THE FOOD YOU BOUGHT JUST DIDN'T LAST AND YOU DIDN'T HAVE MONEY TO GET MORE.: PATIENT DECLINED

## 2023-08-25 ENCOUNTER — ROUTINE PRENATAL (OUTPATIENT)
Age: 33
End: 2023-08-25

## 2023-08-25 VITALS
DIASTOLIC BLOOD PRESSURE: 79 MMHG | SYSTOLIC BLOOD PRESSURE: 125 MMHG | BODY MASS INDEX: 35.24 KG/M2 | WEIGHT: 211.78 LBS

## 2023-08-25 DIAGNOSIS — Z13.79 GENETIC TESTING: ICD-10-CM

## 2023-08-25 DIAGNOSIS — Z34.90 ENCOUNTER FOR PRENATAL CARE: Primary | ICD-10-CM

## 2023-08-25 LAB
ABO + RH BLD: NORMAL
BLOOD BANK CMNT PATIENT-IMP: NORMAL
BLOOD GROUP ANTIBODIES SERPL: NORMAL
ERYTHROCYTE [DISTWIDTH] IN BLOOD BY AUTOMATED COUNT: 12.3 % (ref 11.5–14.5)
HBV SURFACE AG SER QL: <0.1 INDEX
HBV SURFACE AG SER QL: NEGATIVE
HCT VFR BLD AUTO: 36.8 % (ref 35–47)
HCV AB SER IA-ACNC: 0.11 INDEX
HCV AB SERPL QL IA: NONREACTIVE
HGB BLD-MCNC: 12.1 G/DL (ref 11.5–16)
HIV 1+2 AB+HIV1 P24 AG SERPL QL IA: NONREACTIVE
HIV 1/2 RESULT COMMENT: NORMAL
MCH RBC QN AUTO: 29.3 PG (ref 26–34)
MCHC RBC AUTO-ENTMCNC: 32.9 G/DL (ref 30–36.5)
MCV RBC AUTO: 89.1 FL (ref 80–99)
NRBC # BLD: 0 K/UL (ref 0–0.01)
NRBC BLD-RTO: 0 PER 100 WBC
PLATELET # BLD AUTO: 255 K/UL (ref 150–400)
PMV BLD AUTO: 10.8 FL (ref 8.9–12.9)
RBC # BLD AUTO: 4.13 M/UL (ref 3.8–5.2)
RUBV IGG SERPL IA-ACNC: NORMAL IU/ML
SPECIMEN EXP DATE BLD: NORMAL
WBC # BLD AUTO: 9.5 K/UL (ref 3.6–11)

## 2023-08-25 PROCEDURE — 0502F SUBSEQUENT PRENATAL CARE: CPT | Performed by: OBSTETRICS & GYNECOLOGY

## 2023-08-28 LAB — T PALLIDUM AB SER QL IA: NON REACTIVE

## 2023-09-01 LAB
ABO, EXTERNAL RESULT: NORMAL
HEP B, EXTERNAL RESULT: NEGATIVE
HEPATITIS C ANTIBODY, EXTERNAL RESULT: NEGATIVE
HIV, EXTERNAL RESULT: NEGATIVE
Lab: NORMAL
NTRA 1P36 DELETION SYNDROME POPULATION-BASED RISK TEXT: NORMAL
NTRA 1P36 DELETION SYNDROME RESULT TEXT: NORMAL
NTRA 1P36 DELETION SYNDROME RISK SCORE TEXT: NORMAL
NTRA 22Q11.2 DELETION SYNDROME POPULATION-BASED RISK TEXT: NORMAL
NTRA 22Q11.2 DELETION SYNDROME RESULT TEXT: NORMAL
NTRA 22Q11.2 DELETION SYNDROME RISK SCORE TEXT: NORMAL
NTRA ANGELMAN SYNDROME POPULATION-BASED RISK TEXT: NORMAL
NTRA ANGELMAN SYNDROME RESULT TEXT: NORMAL
NTRA ANGELMAN SYNDROME RISK SCORE TEXT: NORMAL
NTRA CRI-DU-CHAT SYNDROME POPULATION-BASED RISK TEXT: NORMAL
NTRA CRI-DU-CHAT SYNDROME RESULT TEXT: NORMAL
NTRA CRI-DU-CHAT SYNDROME RISK SCORE TEXT: NORMAL
NTRA FETAL FRACTION: NORMAL
NTRA GENDER OF FETUS: NORMAL
NTRA MONOSOMY X AGE-BASED RISK TEXT: NORMAL
NTRA MONOSOMY X RESULT TEXT: NORMAL
NTRA MONOSOMY X RISK SCORE TEXT: NORMAL
NTRA PRADER-WILLI SYNDROME POPULATION-BASED RISK TEXT: NORMAL
NTRA PRADER-WILLI SYNDROME RESULT TEXT: NORMAL
NTRA PRADER-WILLI SYNDROME RISK SCORE TEXT: NORMAL
NTRA TRIPLOIDY RESULT TEXT: NORMAL
NTRA TRISOMY 13 AGE-BASED RISK TEXT: NORMAL
NTRA TRISOMY 13 RESULT TEXT: NORMAL
NTRA TRISOMY 13 RISK SCORE TEXT: NORMAL
NTRA TRISOMY 18 AGE-BASED RISK TEXT: NORMAL
NTRA TRISOMY 18 RESULT TEXT: NORMAL
NTRA TRISOMY 18 RISK SCORE TEXT: NORMAL
NTRA TRISOMY 21 AGE-BASED RISK TEXT: NORMAL
NTRA TRISOMY 21 RESULT TEXT: NORMAL
NTRA TRISOMY 21 RISK SCORE TEXT: NORMAL
RH FACTOR, EXTERNAL RESULT: NEGATIVE
RUBELLA TITER, EXTERNAL RESULT: REACTIVE

## 2023-09-22 ENCOUNTER — ROUTINE PRENATAL (OUTPATIENT)
Age: 33
End: 2023-09-22

## 2023-09-22 VITALS — WEIGHT: 216.4 LBS | DIASTOLIC BLOOD PRESSURE: 78 MMHG | SYSTOLIC BLOOD PRESSURE: 138 MMHG | BODY MASS INDEX: 36.01 KG/M2

## 2023-09-22 DIAGNOSIS — Z34.90 ENCOUNTER FOR PRENATAL CARE: Primary | ICD-10-CM

## 2023-09-27 LAB
AFP INTERP SERPL-IMP: NORMAL
AFP INTERP SERPL-IMP: NORMAL
AFP MOM SERPL: 1.8
AFP SERPL-MCNC: 47.5 NG/ML
AGE AT DELIVERY: 33.8 YR
COMMENT: NORMAL
GA METHOD: NORMAL
GA: 15.7 WEEKS
IDDM PATIENT QL: NO
Lab: NORMAL
MULTIPLE PREGNANCY: NO
NEURAL TUBE DEFECT RISK FETUS: 1278 %

## 2023-10-27 ENCOUNTER — ROUTINE PRENATAL (OUTPATIENT)
Age: 33
End: 2023-10-27

## 2023-10-27 ENCOUNTER — ROUTINE PRENATAL (OUTPATIENT)
Age: 33
End: 2023-10-27
Payer: COMMERCIAL

## 2023-10-27 VITALS — SYSTOLIC BLOOD PRESSURE: 115 MMHG | HEART RATE: 87 BPM | DIASTOLIC BLOOD PRESSURE: 68 MMHG

## 2023-10-27 VITALS — SYSTOLIC BLOOD PRESSURE: 97 MMHG | DIASTOLIC BLOOD PRESSURE: 62 MMHG | WEIGHT: 220 LBS | BODY MASS INDEX: 36.61 KG/M2

## 2023-10-27 DIAGNOSIS — Z34.80 ENCOUNTER FOR SUPERVISION OF OTHER NORMAL PREGNANCY, UNSPECIFIED TRIMESTER: ICD-10-CM

## 2023-10-27 DIAGNOSIS — Z3A.20 20 WEEKS GESTATION OF PREGNANCY: Primary | ICD-10-CM

## 2023-10-27 DIAGNOSIS — E66.09 CLASS 2 OBESITY DUE TO EXCESS CALORIES WITHOUT SERIOUS COMORBIDITY WITH BODY MASS INDEX (BMI) OF 36.0 TO 36.9 IN ADULT: ICD-10-CM

## 2023-10-27 PROBLEM — E66.812 CLASS 2 OBESITY DUE TO EXCESS CALORIES WITHOUT SERIOUS COMORBIDITY WITH BODY MASS INDEX (BMI) OF 36.0 TO 36.9 IN ADULT: Status: ACTIVE | Noted: 2023-10-27

## 2023-10-27 PROCEDURE — 0502F SUBSEQUENT PRENATAL CARE: CPT | Performed by: OBSTETRICS & GYNECOLOGY

## 2023-10-27 PROCEDURE — 76811 OB US DETAILED SNGL FETUS: CPT | Performed by: OBSTETRICS & GYNECOLOGY

## 2023-10-27 PROCEDURE — 99203 OFFICE O/P NEW LOW 30 MIN: CPT | Performed by: OBSTETRICS & GYNECOLOGY

## 2023-11-17 ENCOUNTER — ROUTINE PRENATAL (OUTPATIENT)
Age: 33
End: 2023-11-17

## 2023-11-17 VITALS — BODY MASS INDEX: 36.58 KG/M2 | SYSTOLIC BLOOD PRESSURE: 106 MMHG | WEIGHT: 219.8 LBS | DIASTOLIC BLOOD PRESSURE: 67 MMHG

## 2023-11-17 DIAGNOSIS — Z34.80 ENCOUNTER FOR SUPERVISION OF OTHER NORMAL PREGNANCY, UNSPECIFIED TRIMESTER: Primary | ICD-10-CM

## 2023-11-17 PROCEDURE — 0502F SUBSEQUENT PRENATAL CARE: CPT | Performed by: OBSTETRICS & GYNECOLOGY

## 2023-12-15 ENCOUNTER — ROUTINE PRENATAL (OUTPATIENT)
Age: 33
End: 2023-12-15

## 2023-12-15 VITALS
DIASTOLIC BLOOD PRESSURE: 66 MMHG | SYSTOLIC BLOOD PRESSURE: 102 MMHG | WEIGHT: 225.3 LBS | BODY MASS INDEX: 37.49 KG/M2 | HEART RATE: 80 BPM

## 2023-12-15 DIAGNOSIS — Z23 ENCOUNTER FOR IMMUNIZATION: ICD-10-CM

## 2023-12-15 DIAGNOSIS — Z34.80 ENCOUNTER FOR SUPERVISION OF OTHER NORMAL PREGNANCY, UNSPECIFIED TRIMESTER: Primary | ICD-10-CM

## 2023-12-16 LAB
BASOPHILS # BLD AUTO: 0 X10E3/UL (ref 0–0.2)
BASOPHILS NFR BLD AUTO: 0 %
BLD GP AB SCN SERPL QL: NEGATIVE
EOSINOPHIL # BLD AUTO: 0.2 X10E3/UL (ref 0–0.4)
EOSINOPHIL NFR BLD AUTO: 2 %
ERYTHROCYTE [DISTWIDTH] IN BLOOD BY AUTOMATED COUNT: 12.9 % (ref 11.7–15.4)
GLUCOSE 1H P 50 G GLC PO SERPL-MCNC: 132 MG/DL (ref 70–139)
HCT VFR BLD AUTO: 34.2 % (ref 34–46.6)
HGB BLD-MCNC: 11.2 G/DL (ref 11.1–15.9)
IMM GRANULOCYTES # BLD AUTO: 0.1 X10E3/UL (ref 0–0.1)
IMM GRANULOCYTES NFR BLD AUTO: 1 %
LYMPHOCYTES # BLD AUTO: 1.8 X10E3/UL (ref 0.7–3.1)
LYMPHOCYTES NFR BLD AUTO: 18 %
MCH RBC QN AUTO: 30.1 PG (ref 26.6–33)
MCHC RBC AUTO-ENTMCNC: 32.7 G/DL (ref 31.5–35.7)
MCV RBC AUTO: 92 FL (ref 79–97)
MONOCYTES # BLD AUTO: 0.5 X10E3/UL (ref 0.1–0.9)
MONOCYTES NFR BLD AUTO: 6 %
NEUTROPHILS # BLD AUTO: 7.2 X10E3/UL (ref 1.4–7)
NEUTROPHILS NFR BLD AUTO: 73 %
PLATELET # BLD AUTO: 209 X10E3/UL (ref 150–450)
RBC # BLD AUTO: 3.72 X10E6/UL (ref 3.77–5.28)
WBC # BLD AUTO: 9.7 X10E3/UL (ref 3.4–10.8)

## 2024-01-12 ENCOUNTER — ROUTINE PRENATAL (OUTPATIENT)
Age: 34
End: 2024-01-12

## 2024-01-12 VITALS — SYSTOLIC BLOOD PRESSURE: 110 MMHG | WEIGHT: 224.4 LBS | BODY MASS INDEX: 37.34 KG/M2 | DIASTOLIC BLOOD PRESSURE: 66 MMHG

## 2024-01-12 DIAGNOSIS — Z34.80 ENCOUNTER FOR SUPERVISION OF OTHER NORMAL PREGNANCY, UNSPECIFIED TRIMESTER: Primary | ICD-10-CM

## 2024-01-12 PROCEDURE — 0502F SUBSEQUENT PRENATAL CARE: CPT | Performed by: OBSTETRICS & GYNECOLOGY

## 2024-01-26 ENCOUNTER — ROUTINE PRENATAL (OUTPATIENT)
Age: 34
End: 2024-01-26

## 2024-01-26 VITALS — BODY MASS INDEX: 37.48 KG/M2 | WEIGHT: 225.2 LBS | DIASTOLIC BLOOD PRESSURE: 83 MMHG | SYSTOLIC BLOOD PRESSURE: 119 MMHG

## 2024-01-26 DIAGNOSIS — Z34.80 ENCOUNTER FOR SUPERVISION OF OTHER NORMAL PREGNANCY, UNSPECIFIED TRIMESTER: Primary | ICD-10-CM

## 2024-01-26 ASSESSMENT — PATIENT HEALTH QUESTIONNAIRE - PHQ9
SUM OF ALL RESPONSES TO PHQ QUESTIONS 1-9: 0
2. FEELING DOWN, DEPRESSED OR HOPELESS: 0
1. LITTLE INTEREST OR PLEASURE IN DOING THINGS: 0
SUM OF ALL RESPONSES TO PHQ QUESTIONS 1-9: 0
SUM OF ALL RESPONSES TO PHQ9 QUESTIONS 1 & 2: 0
SUM OF ALL RESPONSES TO PHQ QUESTIONS 1-9: 0
SUM OF ALL RESPONSES TO PHQ QUESTIONS 1-9: 0

## 2024-02-09 ENCOUNTER — ROUTINE PRENATAL (OUTPATIENT)
Age: 34
End: 2024-02-09

## 2024-02-09 VITALS — DIASTOLIC BLOOD PRESSURE: 63 MMHG | WEIGHT: 228 LBS | SYSTOLIC BLOOD PRESSURE: 100 MMHG | BODY MASS INDEX: 37.94 KG/M2

## 2024-02-09 DIAGNOSIS — Z34.80 ENCOUNTER FOR SUPERVISION OF OTHER NORMAL PREGNANCY, UNSPECIFIED TRIMESTER: Primary | ICD-10-CM

## 2024-02-09 LAB — GBS, EXTERNAL RESULT: POSITIVE

## 2024-02-09 PROCEDURE — 0502F SUBSEQUENT PRENATAL CARE: CPT | Performed by: OBSTETRICS & GYNECOLOGY

## 2024-02-11 LAB — GP B STREP DNA SPEC QL NAA+PROBE: POSITIVE

## 2024-02-16 ENCOUNTER — ROUTINE PRENATAL (OUTPATIENT)
Age: 34
End: 2024-02-16

## 2024-02-16 VITALS — DIASTOLIC BLOOD PRESSURE: 69 MMHG | WEIGHT: 228.4 LBS | BODY MASS INDEX: 38.01 KG/M2 | SYSTOLIC BLOOD PRESSURE: 106 MMHG

## 2024-02-16 DIAGNOSIS — Z34.80 ENCOUNTER FOR SUPERVISION OF OTHER NORMAL PREGNANCY, UNSPECIFIED TRIMESTER: Primary | ICD-10-CM

## 2024-02-16 PROCEDURE — 0502F SUBSEQUENT PRENATAL CARE: CPT | Performed by: OBSTETRICS & GYNECOLOGY

## 2024-02-29 ENCOUNTER — ROUTINE PRENATAL (OUTPATIENT)
Age: 34
End: 2024-02-29

## 2024-02-29 VITALS — DIASTOLIC BLOOD PRESSURE: 80 MMHG | SYSTOLIC BLOOD PRESSURE: 128 MMHG | BODY MASS INDEX: 38.37 KG/M2 | WEIGHT: 230.6 LBS

## 2024-02-29 DIAGNOSIS — Z34.80 ENCOUNTER FOR SUPERVISION OF OTHER NORMAL PREGNANCY, UNSPECIFIED TRIMESTER: Primary | ICD-10-CM

## 2024-02-29 PROCEDURE — 0502F SUBSEQUENT PRENATAL CARE: CPT | Performed by: OBSTETRICS & GYNECOLOGY

## 2024-03-04 ENCOUNTER — ANESTHESIA EVENT (OUTPATIENT)
Facility: HOSPITAL | Age: 34
End: 2024-03-04
Payer: COMMERCIAL

## 2024-03-04 ENCOUNTER — ANESTHESIA (OUTPATIENT)
Facility: HOSPITAL | Age: 34
End: 2024-03-04
Payer: COMMERCIAL

## 2024-03-04 ENCOUNTER — HOSPITAL ENCOUNTER (INPATIENT)
Facility: HOSPITAL | Age: 34
LOS: 2 days | Discharge: HOME | End: 2024-03-06
Attending: OBSTETRICS & GYNECOLOGY | Admitting: OBSTETRICS & GYNECOLOGY
Payer: COMMERCIAL

## 2024-03-04 DIAGNOSIS — G89.18 POSTOPERATIVE PAIN: Primary | ICD-10-CM

## 2024-03-04 PROBLEM — Z98.891 PREVIOUS CESAREAN SECTION: Status: ACTIVE | Noted: 2024-03-04

## 2024-03-04 LAB
ERYTHROCYTE [DISTWIDTH] IN BLOOD BY AUTOMATED COUNT: 13.2 % (ref 11.5–14.5)
HCT VFR BLD AUTO: 33.9 % (ref 35–47)
HGB BLD-MCNC: 11.9 G/DL (ref 11.5–16)
MCH RBC QN AUTO: 30.9 PG (ref 26–34)
MCHC RBC AUTO-ENTMCNC: 35.1 G/DL (ref 30–36.5)
MCV RBC AUTO: 88.1 FL (ref 80–99)
NRBC # BLD: 0 K/UL (ref 0–0.01)
NRBC BLD-RTO: 0 PER 100 WBC
PLATELET # BLD AUTO: 176 K/UL (ref 150–400)
PMV BLD AUTO: 10.8 FL (ref 8.9–12.9)
RBC # BLD AUTO: 3.85 M/UL (ref 3.8–5.2)
WBC # BLD AUTO: 11.3 K/UL (ref 3.6–11)

## 2024-03-04 PROCEDURE — 6360000002 HC RX W HCPCS: Performed by: NURSE ANESTHETIST, CERTIFIED REGISTERED

## 2024-03-04 PROCEDURE — 86780 TREPONEMA PALLIDUM: CPT

## 2024-03-04 PROCEDURE — 7100000001 HC PACU RECOVERY - ADDTL 15 MIN: Performed by: OBSTETRICS & GYNECOLOGY

## 2024-03-04 PROCEDURE — 2709999900 HC NON-CHARGEABLE SUPPLY: Performed by: OBSTETRICS & GYNECOLOGY

## 2024-03-04 PROCEDURE — 10907ZC DRAINAGE OF AMNIOTIC FLUID, THERAPEUTIC FROM PRODUCTS OF CONCEPTION, VIA NATURAL OR ARTIFICIAL OPENING: ICD-10-PCS | Performed by: OBSTETRICS & GYNECOLOGY

## 2024-03-04 PROCEDURE — 2580000003 HC RX 258: Performed by: OBSTETRICS & GYNECOLOGY

## 2024-03-04 PROCEDURE — 86870 RBC ANTIBODY IDENTIFICATION: CPT

## 2024-03-04 PROCEDURE — 86922 COMPATIBILITY TEST ANTIGLOB: CPT

## 2024-03-04 PROCEDURE — 6360000002 HC RX W HCPCS: Performed by: OBSTETRICS & GYNECOLOGY

## 2024-03-04 PROCEDURE — 3700000000 HC ANESTHESIA ATTENDED CARE: Performed by: OBSTETRICS & GYNECOLOGY

## 2024-03-04 PROCEDURE — 6370000000 HC RX 637 (ALT 250 FOR IP): Performed by: OBSTETRICS & GYNECOLOGY

## 2024-03-04 PROCEDURE — 6360000002 HC RX W HCPCS: Performed by: ANESTHESIOLOGY

## 2024-03-04 PROCEDURE — 7100000000 HC PACU RECOVERY - FIRST 15 MIN: Performed by: OBSTETRICS & GYNECOLOGY

## 2024-03-04 PROCEDURE — 36415 COLL VENOUS BLD VENIPUNCTURE: CPT

## 2024-03-04 PROCEDURE — 2500000003 HC RX 250 WO HCPCS: Performed by: OBSTETRICS & GYNECOLOGY

## 2024-03-04 PROCEDURE — 94761 N-INVAS EAR/PLS OXIMETRY MLT: CPT

## 2024-03-04 PROCEDURE — 86921 COMPATIBILITY TEST INCUBATE: CPT

## 2024-03-04 PROCEDURE — 86901 BLOOD TYPING SEROLOGIC RH(D): CPT

## 2024-03-04 PROCEDURE — 85027 COMPLETE CBC AUTOMATED: CPT

## 2024-03-04 PROCEDURE — 86850 RBC ANTIBODY SCREEN: CPT

## 2024-03-04 PROCEDURE — 6360000002 HC RX W HCPCS

## 2024-03-04 PROCEDURE — 1120000000 HC RM PRIVATE OB

## 2024-03-04 PROCEDURE — 59510 CESAREAN DELIVERY: CPT | Performed by: OBSTETRICS & GYNECOLOGY

## 2024-03-04 PROCEDURE — 3700000001 HC ADD 15 MINUTES (ANESTHESIA): Performed by: OBSTETRICS & GYNECOLOGY

## 2024-03-04 PROCEDURE — 86920 COMPATIBILITY TEST SPIN: CPT

## 2024-03-04 PROCEDURE — 86900 BLOOD TYPING SEROLOGIC ABO: CPT

## 2024-03-04 PROCEDURE — 3609079900 HC CESAREAN SECTION: Performed by: OBSTETRICS & GYNECOLOGY

## 2024-03-04 RX ORDER — DIPHENHYDRAMINE HYDROCHLORIDE 50 MG/ML
12.5 INJECTION INTRAMUSCULAR; INTRAVENOUS
Status: ACTIVE | OUTPATIENT
Start: 2024-03-04 | End: 2024-03-05

## 2024-03-04 RX ORDER — SODIUM CHLORIDE, SODIUM LACTATE, POTASSIUM CHLORIDE, CALCIUM CHLORIDE 600; 310; 30; 20 MG/100ML; MG/100ML; MG/100ML; MG/100ML
INJECTION, SOLUTION INTRAVENOUS CONTINUOUS
Status: DISCONTINUED | OUTPATIENT
Start: 2024-03-04 | End: 2024-03-04

## 2024-03-04 RX ORDER — KETOROLAC TROMETHAMINE 30 MG/ML
30 INJECTION, SOLUTION INTRAMUSCULAR; INTRAVENOUS EVERY 6 HOURS PRN
Status: DISCONTINUED | OUTPATIENT
Start: 2024-03-04 | End: 2024-03-06 | Stop reason: HOSPADM

## 2024-03-04 RX ORDER — FENTANYL CITRATE 50 UG/ML
100 INJECTION, SOLUTION INTRAMUSCULAR; INTRAVENOUS
Status: ACTIVE | OUTPATIENT
Start: 2024-03-04 | End: 2024-03-05

## 2024-03-04 RX ORDER — SODIUM CHLORIDE 0.9 % (FLUSH) 0.9 %
10 SYRINGE (ML) INJECTION EVERY 12 HOURS SCHEDULED
Status: DISCONTINUED | OUTPATIENT
Start: 2024-03-04 | End: 2024-03-04

## 2024-03-04 RX ORDER — ONDANSETRON 4 MG/1
4 TABLET, ORALLY DISINTEGRATING ORAL EVERY 8 HOURS PRN
Status: DISCONTINUED | OUTPATIENT
Start: 2024-03-04 | End: 2024-03-06 | Stop reason: HOSPADM

## 2024-03-04 RX ORDER — LIDOCAINE HYDROCHLORIDE 10 MG/ML
1 INJECTION, SOLUTION EPIDURAL; INFILTRATION; INTRACAUDAL; PERINEURAL
Status: ACTIVE | OUTPATIENT
Start: 2024-03-04 | End: 2024-03-05

## 2024-03-04 RX ORDER — METHYLERGONOVINE MALEATE 0.2 MG/ML
200 INJECTION INTRAVENOUS ONCE
Status: COMPLETED | OUTPATIENT
Start: 2024-03-04 | End: 2024-03-04

## 2024-03-04 RX ORDER — ACETAMINOPHEN 500 MG
1000 TABLET ORAL EVERY 8 HOURS SCHEDULED
Status: DISCONTINUED | OUTPATIENT
Start: 2024-03-04 | End: 2024-03-06 | Stop reason: HOSPADM

## 2024-03-04 RX ORDER — SODIUM CHLORIDE, SODIUM LACTATE, POTASSIUM CHLORIDE, AND CALCIUM CHLORIDE .6; .31; .03; .02 G/100ML; G/100ML; G/100ML; G/100ML
1000 INJECTION, SOLUTION INTRAVENOUS ONCE
Status: COMPLETED | OUTPATIENT
Start: 2024-03-04 | End: 2024-03-04

## 2024-03-04 RX ORDER — TRANEXAMIC ACID 10 MG/ML
1000 INJECTION, SOLUTION INTRAVENOUS
Status: COMPLETED | OUTPATIENT
Start: 2024-03-04 | End: 2024-03-04

## 2024-03-04 RX ORDER — ONDANSETRON 2 MG/ML
4 INJECTION INTRAMUSCULAR; INTRAVENOUS
Status: ACTIVE | OUTPATIENT
Start: 2024-03-04 | End: 2024-03-05

## 2024-03-04 RX ORDER — SODIUM CHLORIDE 0.9 % (FLUSH) 0.9 %
5-40 SYRINGE (ML) INJECTION EVERY 12 HOURS SCHEDULED
Status: DISCONTINUED | OUTPATIENT
Start: 2024-03-04 | End: 2024-03-06 | Stop reason: HOSPADM

## 2024-03-04 RX ORDER — MORPHINE SULFATE 1 MG/ML
INJECTION, SOLUTION EPIDURAL; INTRATHECAL; INTRAVENOUS PRN
Status: DISCONTINUED | OUTPATIENT
Start: 2024-03-04 | End: 2024-03-04 | Stop reason: SDUPTHER

## 2024-03-04 RX ORDER — SODIUM CHLORIDE 9 MG/ML
INJECTION, SOLUTION INTRAVENOUS PRN
Status: DISCONTINUED | OUTPATIENT
Start: 2024-03-04 | End: 2024-03-06 | Stop reason: HOSPADM

## 2024-03-04 RX ORDER — MIDAZOLAM HYDROCHLORIDE 2 MG/2ML
2 INJECTION, SOLUTION INTRAMUSCULAR; INTRAVENOUS
Status: ACTIVE | OUTPATIENT
Start: 2024-03-04 | End: 2024-03-05

## 2024-03-04 RX ORDER — OXYCODONE HYDROCHLORIDE 5 MG/1
5 TABLET ORAL EVERY 4 HOURS PRN
Status: DISCONTINUED | OUTPATIENT
Start: 2024-03-04 | End: 2024-03-06 | Stop reason: HOSPADM

## 2024-03-04 RX ORDER — FENTANYL CITRATE 50 UG/ML
INJECTION, SOLUTION INTRAMUSCULAR; INTRAVENOUS PRN
Status: DISCONTINUED | OUTPATIENT
Start: 2024-03-04 | End: 2024-03-04 | Stop reason: SDUPTHER

## 2024-03-04 RX ORDER — ONDANSETRON 2 MG/ML
4 INJECTION INTRAMUSCULAR; INTRAVENOUS EVERY 6 HOURS PRN
Status: DISCONTINUED | OUTPATIENT
Start: 2024-03-04 | End: 2024-03-06 | Stop reason: HOSPADM

## 2024-03-04 RX ORDER — SODIUM CHLORIDE, SODIUM LACTATE, POTASSIUM CHLORIDE, CALCIUM CHLORIDE 600; 310; 30; 20 MG/100ML; MG/100ML; MG/100ML; MG/100ML
INJECTION, SOLUTION INTRAVENOUS CONTINUOUS
Status: DISCONTINUED | OUTPATIENT
Start: 2024-03-04 | End: 2024-03-06 | Stop reason: HOSPADM

## 2024-03-04 RX ORDER — METHYLERGONOVINE MALEATE 0.2 MG/ML
INJECTION INTRAVENOUS
Status: COMPLETED
Start: 2024-03-04 | End: 2024-03-04

## 2024-03-04 RX ORDER — CITRIC ACID/SODIUM CITRATE 334-500MG
30 SOLUTION, ORAL ORAL ONCE
Status: DISCONTINUED | OUTPATIENT
Start: 2024-03-04 | End: 2024-03-04

## 2024-03-04 RX ORDER — LANOLIN/MINERAL OIL
LOTION (ML) TOPICAL
Status: DISCONTINUED | OUTPATIENT
Start: 2024-03-04 | End: 2024-03-06 | Stop reason: HOSPADM

## 2024-03-04 RX ORDER — IBUPROFEN 800 MG/1
800 TABLET ORAL EVERY 8 HOURS SCHEDULED
Status: DISCONTINUED | OUTPATIENT
Start: 2024-03-04 | End: 2024-03-06 | Stop reason: HOSPADM

## 2024-03-04 RX ORDER — SODIUM CHLORIDE 9 MG/ML
INJECTION, SOLUTION INTRAVENOUS PRN
Status: DISCONTINUED | OUTPATIENT
Start: 2024-03-04 | End: 2024-03-04

## 2024-03-04 RX ORDER — SODIUM CHLORIDE 0.9 % (FLUSH) 0.9 %
10 SYRINGE (ML) INJECTION PRN
Status: DISCONTINUED | OUTPATIENT
Start: 2024-03-04 | End: 2024-03-04

## 2024-03-04 RX ORDER — SODIUM CHLORIDE 0.9 % (FLUSH) 0.9 %
5-40 SYRINGE (ML) INJECTION PRN
Status: DISCONTINUED | OUTPATIENT
Start: 2024-03-04 | End: 2024-03-06 | Stop reason: HOSPADM

## 2024-03-04 RX ORDER — OXYCODONE HYDROCHLORIDE 5 MG/1
10 TABLET ORAL EVERY 4 HOURS PRN
Status: DISCONTINUED | OUTPATIENT
Start: 2024-03-04 | End: 2024-03-06 | Stop reason: HOSPADM

## 2024-03-04 RX ORDER — BUPIVACAINE HYDROCHLORIDE 7.5 MG/ML
INJECTION, SOLUTION EPIDURAL; RETROBULBAR PRN
Status: DISCONTINUED | OUTPATIENT
Start: 2024-03-04 | End: 2024-03-04 | Stop reason: SDUPTHER

## 2024-03-04 RX ORDER — OXYTOCIN 10 [USP'U]/ML
INJECTION, SOLUTION INTRAMUSCULAR; INTRAVENOUS PRN
Status: DISCONTINUED | OUTPATIENT
Start: 2024-03-04 | End: 2024-03-04 | Stop reason: SDUPTHER

## 2024-03-04 RX ORDER — ONDANSETRON 2 MG/ML
4 INJECTION INTRAMUSCULAR; INTRAVENOUS EVERY 6 HOURS PRN
Status: DISCONTINUED | OUTPATIENT
Start: 2024-03-04 | End: 2024-03-04

## 2024-03-04 RX ADMIN — ACETAMINOPHEN 1000 MG: 500 TABLET ORAL at 21:38

## 2024-03-04 RX ADMIN — TRANEXAMIC ACID 1000 MG: 10 INJECTION, SOLUTION INTRAVENOUS at 13:50

## 2024-03-04 RX ADMIN — BUPIVACAINE HYDROCHLORIDE 1.6 ML: 7.5 INJECTION, SOLUTION EPIDURAL; RETROBULBAR at 12:22

## 2024-03-04 RX ADMIN — SODIUM CHLORIDE, POTASSIUM CHLORIDE, SODIUM LACTATE AND CALCIUM CHLORIDE 1000 ML: 600; 310; 30; 20 INJECTION, SOLUTION INTRAVENOUS at 10:44

## 2024-03-04 RX ADMIN — KETOROLAC TROMETHAMINE 30 MG: 30 INJECTION INTRAMUSCULAR; INTRAVENOUS at 15:17

## 2024-03-04 RX ADMIN — METHYLERGONOVINE MALEATE 200 MCG: 0.2 INJECTION INTRAVENOUS at 13:53

## 2024-03-04 RX ADMIN — OXYTOCIN 40 UNITS: 10 INJECTION, SOLUTION INTRAMUSCULAR; INTRAVENOUS at 12:47

## 2024-03-04 RX ADMIN — SODIUM CHLORIDE, POTASSIUM CHLORIDE, SODIUM LACTATE AND CALCIUM CHLORIDE: 600; 310; 30; 20 INJECTION, SOLUTION INTRAVENOUS at 12:00

## 2024-03-04 RX ADMIN — MORPHINE SULFATE 0.2 MG: 1 INJECTION, SOLUTION EPIDURAL; INTRATHECAL; INTRAVENOUS at 12:22

## 2024-03-04 RX ADMIN — SODIUM CHLORIDE, POTASSIUM CHLORIDE, SODIUM LACTATE AND CALCIUM CHLORIDE: 600; 310; 30; 20 INJECTION, SOLUTION INTRAVENOUS at 13:50

## 2024-03-04 RX ADMIN — SODIUM CHLORIDE, POTASSIUM CHLORIDE, SODIUM LACTATE AND CALCIUM CHLORIDE: 600; 310; 30; 20 INJECTION, SOLUTION INTRAVENOUS at 17:16

## 2024-03-04 RX ADMIN — ONDANSETRON 4 MG: 2 INJECTION INTRAMUSCULAR; INTRAVENOUS at 14:28

## 2024-03-04 RX ADMIN — SODIUM CHLORIDE, POTASSIUM CHLORIDE, SODIUM LACTATE AND CALCIUM CHLORIDE: 600; 310; 30; 20 INJECTION, SOLUTION INTRAVENOUS at 20:00

## 2024-03-04 RX ADMIN — FENTANYL CITRATE 15 MCG: 50 INJECTION, SOLUTION INTRAMUSCULAR; INTRAVENOUS at 12:22

## 2024-03-04 RX ADMIN — CEFAZOLIN SODIUM 2000 MG: 1 POWDER, FOR SOLUTION INTRAMUSCULAR; INTRAVENOUS at 12:24

## 2024-03-04 RX ADMIN — METHYLERGONOVINE MALEATE 200 MCG: 0.2 INJECTION, SOLUTION INTRAMUSCULAR; INTRAVENOUS at 13:53

## 2024-03-04 ASSESSMENT — PAIN DESCRIPTION - LOCATION: LOCATION: ABDOMEN;INCISION

## 2024-03-04 ASSESSMENT — PAIN DESCRIPTION - ORIENTATION: ORIENTATION: MID

## 2024-03-04 ASSESSMENT — PAIN SCALES - GENERAL: PAINLEVEL_OUTOF10: 5

## 2024-03-04 ASSESSMENT — PAIN DESCRIPTION - DESCRIPTORS: DESCRIPTORS: SORE

## 2024-03-04 NOTE — L&D DELIVERY NOTE
Dimitrios POOL MD     Provider Role    Dimitrios Lopez MD Obstetrician    Chloe Burroughs RN Primary Nurse    Erin Hayward RN Primary  Nurse    Karla Fairchild, APRN - CRNA Nurse Anesthetist    Natalie Alberto OB Tech    Meng Marsh OB Tech              Ash Assessment    Living Status: Living  Delivery Location Comment: OR1        Skin Color:   Heart Rate:   Reflex Irritability:   Muscle Tone:   Respiratory Effort:   Total:            1 Minute:    1    2    2    2    2    9         5 Minute:    1    2    2    2    2    9                                        Apgars Assigned By: ERIN HAYWARD RN              Resuscitation    Method: Bulb Suction, Stimulation, Room Air             Ash Measurements      Birth Weight: 3685 g   Birth Length: 50.8 cm     Head Circumference: 37 cm     Chest Circumference: 35 cm     Abdominal Girth: 35 cm

## 2024-03-04 NOTE — PROGRESS NOTES
, 39.1 gestation arrived for repeat c section today with Dr. John MD notified of arrival and pt fully admitted. Denies VB, LOF, and endorses +FM and BH Ctx. Pt oriented to room and call bell, verbalized understanding of all teaching.     1246 Delivery of female infant, apgars 9/9. Total QBL 989ml. Bleeding stable and fundus firm.     1630 TRANSFER - OUT REPORT:    Verbal report given to Gayla RN on Esther I Carrington  being transferred to [facility]  for routine progression of patient care       Report consisted of patient's Situation, Background, Assessment and   Recommendations(SBAR).     Information from the following report(s) Nurse Handoff Report, Adult Overview, Surgery Report, Intake/Output, MAR, and Recent Results was reviewed with the receiving nurse.           Lines:   Peripheral IV 24 Right Hand (Active)   Site Assessment Clean, dry & intact 24 1324   Line Status Flushed;Infusing 24 1324   Line Care Connections checked and tightened 24 1245   Phlebitis Assessment No symptoms 24 1245   Infiltration Assessment 0 24 1245   Alcohol Cap Used No 24 1245   Dressing Status Clean, dry & intact 24 1245   Dressing Type Transparent 24 1245        Opportunity for questions and clarification was provided.      Patient transported with:  Registered Nurse  Baby bands and fundus confirmed with receiving RN.

## 2024-03-04 NOTE — ANESTHESIA POSTPROCEDURE EVALUATION
Department of Anesthesiology  Postprocedure Note    Patient: Esther Carrington  MRN: [id number]  YOB: 1990  Date of evaluation: 3/4/2024    Procedure Summary       Date: 24 Room / Location: [facility] L&D 02 / [facility] L&D OR    Anesthesia Start: 1211 Anesthesia Stop: 1324    Procedure:  SECTION (Abdomen) Diagnosis:       Previous  delivery, delivered      (Previous  delivery, delivered [O34.219])    Surgeons: Dimitrios Lopez MD Responsible Provider: Jackelyn Hui MD    Anesthesia Type: spinal ASA Status: 2            Anesthesia Type: No value filed.    Edie Phase I:      Edie Phase II:      Anesthesia Post Evaluation    Patient location during evaluation: PACU  Patient participation: complete - patient participated  Level of consciousness: awake  Airway patency: patent  Nausea & Vomiting: no vomiting and no nausea  Cardiovascular status: hemodynamically stable  Respiratory status: acceptable  Hydration status: stable  Pain management: adequate    No notable events documented.

## 2024-03-04 NOTE — H&P
History & Physical    Name: Esther Carrington MRN: [id number]  SSN: [id number]    YOB: 1990  Age: 33 y.o.  Sex: female        Subjective:     Estimated Date of Delivery: 3/10/24  OB History          5    Para   3    Term   3       0    AB   1    Living   3         SAB   1    IAB   0    Ectopic   0    Molar   0    Multiple   0    Live Births   3                Ms. Carrington is admitted with pregnancy at 39w1d for repeat  section. Prenatal course was complicated by:  Previous C/S preceded by 2 's for repeat C/S  H/O macrosomia  Hemorrhoids  Anemic--8.9 at 12, 11.2 at 28  Please see prenatal records for details.    Past Medical History:   Diagnosis Date    Abnormal Papanicolaou smear of cervix     , ok follow up    AR (allergic rhinitis)     Asthma     Carpal tunnel syndrome on both sides     HPV vaccine counseling     Gardasil /3    Rhesus isoimmunization affecting pregnancy     Routine Papanicolaou smear 18 neg     Past Surgical History:   Procedure Laterality Date     SECTION  22    DILATION AND CURETTAGE  3-4-21    DILATION AND CURETTAGE OF UTERUS  2021    missed AB    GALLBLADDER SURGERY      OTHER SURGICAL HISTORY      dermoid cyst removed left eyebrow - age 4     Social History     Occupational History    Not on file   Tobacco Use    Smoking status: Former     Current packs/day: 0.00     Types: Cigarettes     Quit date: 2005     Years since quittin.9    Smokeless tobacco: Never   Vaping Use    Vaping Use: Never used   Substance and Sexual Activity    Alcohol use: No    Drug use: No    Sexual activity: Yes     Partners: Male     Birth control/protection: None     Family History   Problem Relation Age of Onset    Hypertension Maternal Grandfather     Diabetes Maternal Grandfather     Atrial Fibrillation Maternal Grandfather     Prostate Cancer Maternal Grandfather     Stroke Maternal Grandmother     Hypertension Maternal Grandmother     High

## 2024-03-04 NOTE — OP NOTE
Operative Note      Patient: Esther Carrington  YOB: 1990  MRN: [id number]    Date of Procedure: 3/4/2024    Pre-Op Diagnosis Codes:     * Previous  delivery, delivered [O34.219]    Post-Op Diagnosis: Same       Procedure(s):   SECTION    Surgeon(s):  Dimitrios Lopez MD    Assistant:   * No surgical staff found *    Anesthesia: Spinal    Estimated Blood Loss (mL): 600 cc    Complications: None    Specimens:   * No specimens in log *    Implants:  * No implants in log *      Drains:   Urinary Catheter 24 Begum (Active)   $ Urethral catheter insertion Inserted for procedure 24 1245   Catheter Indications Perioperative use for selected surgical procedures 24 1245   Site Assessment No urethral drainage 24 1245   Urine Color Yellow 24 1324   Urine Appearance Clear 24 1324   Collection Container Standard 24 1245   Securement Method Securing device (Describe) 24 1245   Catheter Best Practices  Drainage tube clipped to bed;Drainage bag less than half full;Catheter secured to thigh;Tamper seal intact;Bag below bladder;Bag not on floor;Lack of dependent loop in tubing 24 1245   Status Draining 24 1324       Findings: normal anatomy        Detailed Description of Procedure:     The patient was prepped with alcohol and draped with ioban in the supine position with a spinal  anesthetic. Begum catheter had been placed using sterile technique.    A Pfannenstiel incision was made, excising her old skin scar.  The fascia was divided and then the rectus muscles were split in the midline.  The peritoneum was entered well above the bladder without difficulty.    The peritoneum over the lower uterine segment was incised and the bladder flap was developed.  The bladder retractor was placed.  The lower uterine segment was entered sharply with a single edge of the Metzenbaum scissors.  Amniotomy was performed, the fluid was medium amount clear. The  264

## 2024-03-04 NOTE — ANESTHESIA PROCEDURE NOTES
Spinal Block    Patient location during procedure: OB  End time: 3/4/2024 12:22 PM  Reason for block: primary anesthetic  Staffing  Performed: resident/CRNA   Anesthesiologist: Jackelyn Hui MD  Resident/CRNA: Karla Fairchild APRN - CRNA  Performed by: Karla Fairchild APRN - CRNA  Authorized by: Jackelyn Hui MD    Spinal Block  Patient position: sitting  Prep: DuraPrep  Patient monitoring: frequent blood pressure checks and continuous pulse ox  Approach: midline  Location: L3/L4  Provider prep: mask and sterile gloves  Needle  Needle type: Pencan   Needle gauge: 25 G  Needle length: 3.5 in  Assessment  Sensory level: T4  Events: none  Swirl obtained: Yes  CSF: clear  Attempts: 1  Hemodynamics: stable  Preanesthetic Checklist  Completed: patient identified, IV checked, site marked, risks and benefits discussed, surgical/procedural consents, equipment checked, pre-op evaluation, timeout performed, anesthesia consent given, oxygen available, monitors applied/VS acknowledged, fire risk safety assessment completed and verbalized and blood product R/B/A discussed and consented

## 2024-03-04 NOTE — ANESTHESIA PRE PROCEDURE
12/15/2023 10:05 AM    HCT 34.2 12/15/2023 10:05 AM    MCV 92 12/15/2023 10:05 AM    RDW 12.9 12/15/2023 10:05 AM     12/15/2023 10:05 AM       CMP: No results found for: \"NA\", \"K\", \"CL\", \"CO2\", \"BUN\", \"CREATININE\", \"GFRAA\", \"AGRATIO\", \"LABGLOM\", \"GLUCOSE\", \"GLU\", \"PROT\", \"CALCIUM\", \"BILITOT\", \"ALKPHOS\", \"AST\", \"ALT\"    POC Tests: No results for input(s): \"POCGLU\", \"POCNA\", \"POCK\", \"POCCL\", \"POCBUN\", \"POCHEMO\", \"POCHCT\" in the last 72 hours.    Coags: No results found for: \"PROTIME\", \"INR\", \"APTT\"    HCG (If Applicable): No results found for: \"PREGTESTUR\", \"PREGSERUM\", \"HCG\", \"HCGQUANT\"     ABGs: No results found for: \"PHART\", \"PO2ART\", \"NVQ3TNI\", \"QLK7KYV\", \"BEART\", \"A8CGDRGB\"     Type & Screen (If Applicable):  No results found for: \"LABABO\", \"LABRH\"    Drug/Infectious Status (If Applicable):  Lab Results   Component Value Date/Time    HEPCAB 0.11 08/25/2023 10:38 AM       COVID-19 Screening (If Applicable):   Lab Results   Component Value Date/Time    COVID19 Please find results under separate order 03/04/2021 08:33 AM    COVID19 Not detected 03/04/2021 08:33 AM           Anesthesia Evaluation  Patient summary reviewed and Nursing notes reviewed  Airway: Mallampati: II       Mouth opening: > = 3 FB   Dental: normal exam         Pulmonary:normal exam    (+)           asthma:                            Cardiovascular:Negative CV ROS                      Neuro/Psych:   (+) neuromuscular disease:            GI/Hepatic/Renal: Neg GI/Hepatic/Renal ROS            Endo/Other: Negative Endo/Other ROS                    Abdominal:             Vascular: negative vascular ROS.         Other Findings:       Anesthesia Plan      spinal     ASA 2       Induction: intravenous.      Anesthetic plan and risks discussed with patient and spouse.    Use of blood products discussed with patient whom.    Plan discussed with CRNA.    Attending anesthesiologist reviewed and agrees with Preprocedure content            Jackelyn Hui,

## 2024-03-05 LAB
HGB BLD-MCNC: 10.5 G/DL (ref 11.5–16)
T PALLIDUM AB SER QL IA: NON REACTIVE

## 2024-03-05 PROCEDURE — 6370000000 HC RX 637 (ALT 250 FOR IP): Performed by: OBSTETRICS & GYNECOLOGY

## 2024-03-05 PROCEDURE — 96372 THER/PROPH/DIAG INJ SC/IM: CPT

## 2024-03-05 PROCEDURE — 85018 HEMOGLOBIN: CPT

## 2024-03-05 PROCEDURE — 86900 BLOOD TYPING SEROLOGIC ABO: CPT

## 2024-03-05 PROCEDURE — 6360000002 HC RX W HCPCS: Performed by: OBSTETRICS & GYNECOLOGY

## 2024-03-05 PROCEDURE — 36415 COLL VENOUS BLD VENIPUNCTURE: CPT

## 2024-03-05 PROCEDURE — 86901 BLOOD TYPING SEROLOGIC RH(D): CPT

## 2024-03-05 PROCEDURE — 85461 HEMOGLOBIN FETAL: CPT

## 2024-03-05 PROCEDURE — 94761 N-INVAS EAR/PLS OXIMETRY MLT: CPT

## 2024-03-05 PROCEDURE — 2580000003 HC RX 258: Performed by: OBSTETRICS & GYNECOLOGY

## 2024-03-05 PROCEDURE — 1120000000 HC RM PRIVATE OB

## 2024-03-05 RX ADMIN — IBUPROFEN 800 MG: 800 TABLET, FILM COATED ORAL at 10:22

## 2024-03-05 RX ADMIN — IBUPROFEN 800 MG: 800 TABLET, FILM COATED ORAL at 01:06

## 2024-03-05 RX ADMIN — ACETAMINOPHEN 1000 MG: 500 TABLET ORAL at 05:09

## 2024-03-05 RX ADMIN — IBUPROFEN 800 MG: 800 TABLET, FILM COATED ORAL at 18:07

## 2024-03-05 RX ADMIN — HUMAN RHO(D) IMMUNE GLOBULIN 300 MCG: 300 INJECTION, SOLUTION INTRAMUSCULAR at 16:02

## 2024-03-05 RX ADMIN — SODIUM CHLORIDE, PRESERVATIVE FREE 10 ML: 5 INJECTION INTRAVENOUS at 05:54

## 2024-03-05 RX ADMIN — ACETAMINOPHEN 1000 MG: 500 TABLET ORAL at 13:12

## 2024-03-05 ASSESSMENT — PAIN DESCRIPTION - LOCATION
LOCATION: ABDOMEN;INCISION
LOCATION: ABDOMEN

## 2024-03-05 ASSESSMENT — PAIN DESCRIPTION - ORIENTATION
ORIENTATION: LOWER
ORIENTATION: MID
ORIENTATION: MID
ORIENTATION: LOWER
ORIENTATION: LOWER

## 2024-03-05 ASSESSMENT — PAIN SCALES - GENERAL
PAINLEVEL_OUTOF10: 2
PAINLEVEL_OUTOF10: 5
PAINLEVEL_OUTOF10: 5
PAINLEVEL_OUTOF10: 3
PAINLEVEL_OUTOF10: 3

## 2024-03-05 ASSESSMENT — PAIN DESCRIPTION - DESCRIPTORS
DESCRIPTORS: SORE
DESCRIPTORS: SORE

## 2024-03-05 NOTE — LACTATION NOTE
This note was copied from a baby's chart.  Mother reports that nursing is going well, she is nursing on demand or every 2-3 hours. This is her 4th child to breast feed. She breast fed her last for 5 months and stopped due to low milk supply. LC did observe a tight frenulum and she states that all of her other children had one that had to be clipped. Mother provided with resources. Breast feeding basics reviewed. A booklet was provided.    Infant latched in the cross cradle position, infant had flanged lips, rhythmic suck pattern and audible swallows. Mother encouraged to continue nursing as she has been and to hand express with her feedings.     Discussed with mother her plan for feeding.  Reviewed the benefits of exclusive breast milk feeding during the hospital stay.   Informed her of the risks of using formula to supplement in the first few days of life as well as the benefits of successful breast milk feeding; referred her to the Breastfeeding booklet about this information.   She acknowledges understanding of information reviewed and states that it is her plan to breast feed her infant.  Will support her choice and offer additional information as needed.     Reviewed breastfeeding basics:  How milk is made and normal  breastfeeding behaviors discussed.  Supply and demand,  stomach size, early feeding cues, skin to skin bonding with comfortable positioning and baby led latch-on reviewed.  How to identify signs of successful breastfeeding sessions reviewed; education on asymetrical latch, signs of effective latching vs shallow, in-effective latching, normal  feeding frequency and duration and expected infant output discussed.  Normal course of breastfeeding discussed including the AAP's recommendation that children receive exclusive breast milk feedings for the first six months of life with breast milk feedings to continue through the first year of life and/or beyond as complimentary table

## 2024-03-05 NOTE — PROGRESS NOTES
Post-Operative Day 1 Progress Note    Patient now post-op day 1 following  delivery.  No significant complaints.  Pain controlled on medication.  Catheter out this morning, normal lochia.    Vitals:  Patient Vitals for the past 8 hrs:   BP Temp Temp src Pulse Resp SpO2   24 1120 112/60 98.8 °F (37.1 °C) Oral 73 16 98 %   24 0626 119/66 98.4 °F (36.9 °C) Oral 64 14 94 %     Temp (24hrs), Av.3 °F (36.8 °C), Min:97.5 °F (36.4 °C), Max:98.8 °F (37.1 °C)      Vital signs stable, afebrile.    Exam:  Patient without distress.               Abdomen soft, flat, non-tender with fundus firm.                 Incision  clean, dry, and intact without erythema.               Lower extremities are negative for swelling, cords or tenderness..    Labs:   Recent Results (from the past 24 hour(s))   RHOGAM PRENATAL EVALUATION    Collection Time: 24  6:28 AM   Result Value Ref Range    ABO/Rh O NEGATIVE     Fetal Screen NEG     Unit Number [id number]     Product Code Blood Bank RH IMMUNE GLOBULIN     Unit Divison 00     Dispense Status Blood Bank ALLOCATED    Hemoglobin    Collection Time: 24  6:54 AM   Result Value Ref Range    Hemoglobin 10.5 (L) 11.5 - 16.0 g/dL       Assessment and Plan:  Patient doing well post- day 1.  Continue routine post-op care and maternal education.

## 2024-03-05 NOTE — CARE COORDINATION
3/5/24  2:06 PM      Case Management Note:    This CM met with MOB and FOB at bedside, introduced self, explained role, and confirmed demographic information on face sheet. MOB and FOB have three other children in the home. They have all of the necessary supplies for baby including car seat, crib, clothing, diapers, etc.       MOB time off: Twelve weeks  Pediatrician: Violet Hernández  Breast/bottle: Breast- has a pump at home for use  Insurance: Miquel LEO- is aware of how to add the baby to her insurance       03/05/24 [id number]   Service Assessment   History Provided By Patient   Services At/After Discharge   Mode of Transport at Discharge Other (see comment)  (spouse will transport at dc)       _____________________  PRESLEY Sellers  [facility]

## 2024-03-06 VITALS
DIASTOLIC BLOOD PRESSURE: 70 MMHG | TEMPERATURE: 98.4 F | RESPIRATION RATE: 18 BRPM | SYSTOLIC BLOOD PRESSURE: 116 MMHG | HEIGHT: 65 IN | WEIGHT: 230 LBS | HEART RATE: 81 BPM | BODY MASS INDEX: 38.32 KG/M2 | OXYGEN SATURATION: 98 %

## 2024-03-06 PROBLEM — Z34.80 ENCOUNTER FOR SUPERVISION OF OTHER NORMAL PREGNANCY, UNSPECIFIED TRIMESTER: Status: RESOLVED | Noted: 2023-08-04 | Resolved: 2024-03-06

## 2024-03-06 LAB
ABO + RH BLD: NORMAL
BLD PROD TYP BPU: NORMAL
BLD PROD TYP BPU: NORMAL
BLOOD BANK CMNT PATIENT-IMP: NORMAL
BLOOD BANK DISPENSE STATUS: NORMAL
BLOOD BANK DISPENSE STATUS: NORMAL
BLOOD GROUP ANTIBODIES SERPL: NORMAL
BLOOD GROUP ANTIBODIES SERPL: NORMAL
BPU ID: NORMAL
BPU ID: NORMAL
CROSSMATCH RESULT: NORMAL
CROSSMATCH RESULT: NORMAL
SPECIMEN EXP DATE BLD: NORMAL
UNIT DIVISION: 0
UNIT DIVISION: 0

## 2024-03-06 PROCEDURE — 6370000000 HC RX 637 (ALT 250 FOR IP): Performed by: OBSTETRICS & GYNECOLOGY

## 2024-03-06 RX ORDER — OXYCODONE HYDROCHLORIDE 5 MG/1
5 TABLET ORAL EVERY 4 HOURS PRN
Qty: 24 TABLET | Refills: 0 | Status: SHIPPED | OUTPATIENT
Start: 2024-03-06 | End: 2024-03-13

## 2024-03-06 RX ORDER — DOCUSATE SODIUM 100 MG/1
100 CAPSULE, LIQUID FILLED ORAL DAILY
Status: DISCONTINUED | OUTPATIENT
Start: 2024-03-06 | End: 2024-03-06 | Stop reason: HOSPADM

## 2024-03-06 RX ORDER — POLYETHYLENE GLYCOL 3350 17 G/17G
17 POWDER, FOR SOLUTION ORAL DAILY
Status: DISCONTINUED | OUTPATIENT
Start: 2024-03-06 | End: 2024-03-06 | Stop reason: HOSPADM

## 2024-03-06 RX ADMIN — IBUPROFEN 800 MG: 800 TABLET, FILM COATED ORAL at 06:07

## 2024-03-06 RX ADMIN — DOCUSATE SODIUM 100 MG: 100 CAPSULE, LIQUID FILLED ORAL at 09:39

## 2024-03-06 RX ADMIN — ACETAMINOPHEN 1000 MG: 500 TABLET ORAL at 06:07

## 2024-03-06 RX ADMIN — POLYETHYLENE GLYCOL 3350 17 G: 17 POWDER, FOR SOLUTION ORAL at 09:39

## 2024-03-06 ASSESSMENT — PAIN DESCRIPTION - LOCATION: LOCATION: ABDOMEN;INCISION

## 2024-03-06 ASSESSMENT — PAIN DESCRIPTION - ORIENTATION: ORIENTATION: MID

## 2024-03-06 ASSESSMENT — PAIN DESCRIPTION - DESCRIPTORS: DESCRIPTORS: ACHING;SORE

## 2024-03-06 ASSESSMENT — PAIN SCALES - GENERAL: PAINLEVEL_OUTOF10: 5

## 2024-03-06 NOTE — DISCHARGE SUMMARY
Obstetrical Discharge Summary     Name: Esther Carrington MRN: [id number]  SSN: [id number]    YOB: 1990  Age: 33 y.o.  Sex: female      Admit Date: 3/4/2024    Discharge Date: 3/6/2024     Admitting Physician: Dimitrios Lopez MD     Attending Physician:  Dimitrios Lopez MD     Admission Diagnoses: Previous  delivery, delivered [O34.219]  Term birth of infant [Z37.0]  Previous  section [Z98.891]    Discharge Diagnoses:   Information for the patient's :  Leisa Carrington [[id number]]   @[id number]@      Additional Diagnoses: none    Immunization(s):   Immunization History   Administered Date(s) Administered    HPV (Human Papilloma Virus)Vaccine 2007    MMR, PRIORIX, M-M-R II, (age 12m+), SC, 0.5mL 10/21/2014    Rho (D) Immune Globulin 10/20/2014, 2021, 2022, 2022    TDaP, ADACEL (age 10y-64y), BOOSTRIX (age 10y+), IM, 0.5mL 2014, 2022, 12/15/2023        Hospital Course: Normal hospital course following the delivery.  The patient was released to her home in good condition.  Patient Instructions:     Reference my discharge instructions.    I spent 10 minutes discharging the patient in face to face contact.      No follow-ups on file.     Signed By:  Dimitrios Lopez MD     2024

## 2024-03-06 NOTE — PROGRESS NOTES
Post-Operative Day 2 Progress/Discharge Note    Patient now post-op day 2 following  delivery.  No significant complaints.  Pain controlled on medication.  Voiding without difficulty, normal lochia.  Requests release today.    Vitals:  Patient Vitals for the past 8 hrs:   BP Temp Temp src Pulse Resp SpO2   24 0736 116/70 98.4 °F (36.9 °C) Oral 81 18 98 %     Temp (24hrs), Av.4 °F (36.9 °C), Min:98.1 °F (36.7 °C), Max:98.8 °F (37.1 °C)      Vital signs stable, afebrile.    Exam:  Patient without distress.               Abdomen soft, flat, non-tender with fundus firm.                 Incision  clean, dry, and intact without erythema.               Lower extremities are negative for swelling, cords or tenderness.    Labs: No results found for this or any previous visit (from the past 24 hour(s)).    Assessment and Plan:  Patient has to date had an uncomplicated post- course.  Continue routine post-op care and maternal education.  Plan discharge for today--see discharge summary.

## 2024-03-06 NOTE — DISCHARGE INSTRUCTIONS
Section  Procedure-specific postoperative instructions  General Instructions   Make an appointment to come back to the office in about 2 weeks if staples removed.   If staples in place at discharge, make an appointment for removal within 7 days of discharge.   Eat and drink your normal diet.   Take laxatives as needed.   Call us if you have questions or problems.    Incision care   With this procedure you will have an incision to care for.  It  may be sensitive one the ends with some sharp pains and it may feel numb in the middle.  Ice packs or heating pad (low setting--don't burn yourself) can be helpful.   Treat these incision like normal skin.  You can shower and wash this skin as you normally would.  You may have steri-strips, like strapping tape bandaids covering your incision.  You can remove those 10-14 days after surgery.  You need to call the office if there is spreading redness around the incision, it feels hot, or has drainage other than just a mild blood-tinged appearance.    Activity   You have surprisingly few  restrictions.  You can walk, lift less than 15 pounds, go up and down stairs, and generally do what you feel like you can do.  Now, you may not FEEL like doing much.  Any major surgery is a stress on your body.  So you may be tired, have no energy, may feel weak and listless.  But no damage will occur if you lift  less than 15 pounds or do moderate activity.   Listen to your body.  If it is telling you to rest, do so.  If you feel good, you won't hurt anything by going shopping of doing household activities.    Bleeding   You will have bleeding.  It may vary from time to time, heavier with activity and lighter at other times.  An occasional clot is normal.  Heavy bleeding, like changing a pad every half hour, is not normal.  Call the office if your bleeding is heavy over several hours.   Bleeding will generally slow down over time.  By six weeks it should be nearly gone but

## 2024-03-06 NOTE — PROGRESS NOTES
Patient discharged to home.  Discharge instructions and education completed and patient reported she had no more questions.  Bands verified on patient and infant, see footprint sheet.  Infant placed in car seat by parent.

## 2024-03-06 NOTE — LACTATION NOTE
This note was copied from a baby's chart.  Mother and baby for discharge. Mother states baby has been breastfeeding well. However, she did supplement for one feeding last night because she was getting sore nipples. Baby has a tight frenulum and was advised to discuss findings with her pediatrician. Resources given. Baby has a pediatric appointment tomorrow. Baby did latch on and breast fed well during visit.     Reviewed breastfeeding basics:  Supply and demand,  stomach size, early  Feeding cues, skin to skin, positioning and baby led latch-on, assymetrical latch with signs of good, deep latch vs shallow, feeding frequency and duration, and log sheet for tracking infant feedings and output.  Breastfeeding Booklet and Warm line information given.  Discussed typical  weight loss and the importance of infant weight checks with pediatrician 1-2 post discharge.       Discussed eating a healthy diet. Instructed mother to eat a variety of foods in order to get a well balanced diet. She should consume an extra 500 calories per day (more than her non-pregnant requirement.) These extra calories will help provide energy needed for optimal breast milk production. Mother also encouraged to \"drink to thirst\" and it is recommended that she drink fluids such as water, fruit/vegetable juice. Nutritious snacks should be available so that she can eat throughout the day to help satisfy her hunger and maintain a good milk supply.       Discussed what to do if she gets engorged or if her nipples become sore:    Engorgement Care Guidelines:  Reviewed how milk is made and normal phases of milk production.  Taught care of engorged breasts - physiologic breastfeeding encouraged with use of cool packs (no ice directly on skin). Consider use of NSAIDS where appropriate for discomfort and inflammation. Can employ light touch, lymphatic drainage techniques on tender grandular tissues. Anticipatory guidance shared.      Care for

## 2024-03-07 LAB
ABO + RH BLD: NORMAL
BLD PROD TYP BPU: NORMAL
BLOOD BANK BLOOD PRODUCT EXPIRATION DATE: NORMAL
BLOOD BANK DISPENSE STATUS: NORMAL
BPU ID: NORMAL
FETAL SCREEN: NORMAL
UNIT DIVISION: 0
UNIT ISSUE DATE/TIME: NORMAL

## 2024-03-14 ENCOUNTER — OFFICE VISIT (OUTPATIENT)
Age: 34
End: 2024-03-14

## 2024-03-14 VITALS — WEIGHT: 216 LBS | DIASTOLIC BLOOD PRESSURE: 75 MMHG | SYSTOLIC BLOOD PRESSURE: 114 MMHG | BODY MASS INDEX: 35.94 KG/M2

## 2024-03-14 DIAGNOSIS — Z09 POSTOP CHECK: Primary | ICD-10-CM

## 2024-03-14 PROCEDURE — 99024 POSTOP FOLLOW-UP VISIT: CPT | Performed by: OBSTETRICS & GYNECOLOGY

## 2024-03-14 NOTE — PROGRESS NOTES
Esther Carrington is a 33 y.o. female presents for a problem visit.    No chief complaint on file.    No LMP recorded.  Birth Control: none. Pt wants to be on birth control  Last Pap: normal obtained 3 year(s) ago.    The patient is reporting having:  incision check   for 1 week.    Aggravating factors include none.  And alleviating factors include none.    Right side of incision is slightly red but no pus, separation or other sign of infection.    Assessment:  Superficial fungal infection    Plan:  Rx Tinactin or Micatin.  Will start POP at PP checkup.        1. Have you been to the ER, urgent care clinic, or hospitalized since your last visit? No    2. Have you seen or consulted any other health care providers outside of the [facility] since your last visit? No    Examination chaperoned by Santo Tucker LPN.

## 2024-04-15 ENCOUNTER — POSTPARTUM VISIT (OUTPATIENT)
Age: 34
End: 2024-04-15

## 2024-04-15 VITALS
BODY MASS INDEX: 34.95 KG/M2 | HEIGHT: 65 IN | DIASTOLIC BLOOD PRESSURE: 71 MMHG | WEIGHT: 209.8 LBS | SYSTOLIC BLOOD PRESSURE: 106 MMHG

## 2024-04-15 PROCEDURE — 0503F POSTPARTUM CARE VISIT: CPT | Performed by: OBSTETRICS & GYNECOLOGY

## 2024-04-15 RX ORDER — ACETAMINOPHEN AND CODEINE PHOSPHATE 120; 12 MG/5ML; MG/5ML
1 SOLUTION ORAL DAILY
Qty: 3 TABLET | Refills: 4 | Status: SHIPPED | OUTPATIENT
Start: 2024-04-15

## 2024-04-15 NOTE — PROGRESS NOTES
Esther Carrington is a 33 y.o. female returns for a routine post-partum follow-up visit     No chief complaint on file.      Postpartum Depression: Medium Risk (3/5/2024)    Caruthers  Depression Scale     Last EPDS Total Score: 5     Last EPDS Self Harm Result: Never         Type of delivery: repeat  section  Date of Delivery: 2024  Breastfeeding: yes  Bleeding Resolved: yes  Birth Control: none. Pt request for Birth control  Last Pap: normal obtained 3 year(s) ago.        Problems: problems - tenderness around the right rib cage.    1. Have you been to the ER, urgent care clinic, or hospitalized since your last visit? No    2. Have you seen or consulted any other health care providers outside of the [facility] since your last visit? No    Examination chaperoned by Santo Tucker LPN.  
check    Plan:  RTO for AE.  Rx for contraception: POP

## 2025-04-24 NOTE — PROGRESS NOTES
Esther Carrington is a 35 y.o. female returns for an annual exam     LMP 3/28/25  Her periods are moderate in flow and usually regular with a 26-32 day interval with 3-7 day duration.  She does not have dysmenorrhea.  Problems: None  Birth Control: OCP (estrogen/progesterone).  Last Pap: normal obtained 4 year(s) ago.  She does not have a history of DIVYA 2, 3 or cervical cancer.   With regard to the Gardisil vaccine, she has received all 3 injections      1. Have you been to the ER, urgent care clinic, or hospitalized since your last visit? No    2. Have you seen or consulted any other health care providers outside of the [facility] since your last visit? No    Examination chaperoned by Tacho Thompson LPN.

## 2025-04-25 ENCOUNTER — OFFICE VISIT (OUTPATIENT)
Age: 35
End: 2025-04-25
Payer: COMMERCIAL

## 2025-04-25 VITALS
BODY MASS INDEX: 36.72 KG/M2 | WEIGHT: 220.4 LBS | DIASTOLIC BLOOD PRESSURE: 73 MMHG | HEART RATE: 68 BPM | TEMPERATURE: 99.2 F | SYSTOLIC BLOOD PRESSURE: 114 MMHG | OXYGEN SATURATION: 98 % | RESPIRATION RATE: 18 BRPM | HEIGHT: 65 IN

## 2025-04-25 DIAGNOSIS — Z01.419 ENCOUNTER FOR ANNUAL ROUTINE GYNECOLOGICAL EXAMINATION: Primary | ICD-10-CM

## 2025-04-25 DIAGNOSIS — Z12.4 PAPANICOLAOU SMEAR FOR CERVICAL CANCER SCREENING: ICD-10-CM

## 2025-04-25 PROCEDURE — 99395 PREV VISIT EST AGE 18-39: CPT | Performed by: OBSTETRICS & GYNECOLOGY

## 2025-04-25 RX ORDER — NORGESTIMATE AND ETHINYL ESTRADIOL 0.25-0.035
1 KIT ORAL DAILY
Qty: 3 PACKET | Refills: 4 | Status: SHIPPED | OUTPATIENT
Start: 2025-04-25

## 2025-04-25 SDOH — ECONOMIC STABILITY: FOOD INSECURITY: WITHIN THE PAST 12 MONTHS, YOU WORRIED THAT YOUR FOOD WOULD RUN OUT BEFORE YOU GOT MONEY TO BUY MORE.: NEVER TRUE

## 2025-04-25 SDOH — ECONOMIC STABILITY: FOOD INSECURITY: WITHIN THE PAST 12 MONTHS, THE FOOD YOU BOUGHT JUST DIDN'T LAST AND YOU DIDN'T HAVE MONEY TO GET MORE.: NEVER TRUE

## 2025-04-25 ASSESSMENT — PATIENT HEALTH QUESTIONNAIRE - PHQ9
2. FEELING DOWN, DEPRESSED OR HOPELESS: NOT AT ALL
SUM OF ALL RESPONSES TO PHQ QUESTIONS 1-9: 0
SUM OF ALL RESPONSES TO PHQ QUESTIONS 1-9: 0
1. LITTLE INTEREST OR PLEASURE IN DOING THINGS: NOT AT ALL
SUM OF ALL RESPONSES TO PHQ QUESTIONS 1-9: 0
SUM OF ALL RESPONSES TO PHQ QUESTIONS 1-9: 0

## 2025-04-25 NOTE — PROGRESS NOTES
Annual exam      Esther Carrington is a ,  35 y.o. female   Patient's last menstrual period was 2025 (exact date).    She presents for her annual checkup.     She is having no problems.  Still on POP but stopped nursing a couple months ago.    Menstrual status:    Her periods are moderate in flow and usually regular with a 26-32 day interval with 3-7 day duration.  She does not have dysmenorrhea.    Sexual history:    She  reports being sexually active and has had partner(s) who are male. She reports using the following method of birth control/protection: None.    Per Nursing Note:  Birth Control: POP (progesterone).  Last Pap: normal obtained 4 year(s) ago.  She does not have a history of DIVYA 2, 3 or cervical cancer.   With regard to the Gardisil vaccine, she has received all 3 injections    Past Medical History:   Diagnosis Date    Abnormal Papanicolaou smear of cervix     , ok follow up    AR (allergic rhinitis)     Asthma     Carpal tunnel syndrome on both sides     HPV vaccine counseling     Gardasil 1/3    Rhesus isoimmunization affecting pregnancy     Routine Papanicolaou smear 18 neg     Past Surgical History:   Procedure Laterality Date     SECTION  22     SECTION N/A 3/4/2024     SECTION performed by Dimitrios Lopez MD at [facility] L&D OR    DILATION AND CURETTAGE  3-4-21    DILATION AND CURETTAGE OF UTERUS  2021    missed AB    GALLBLADDER SURGERY      OTHER SURGICAL HISTORY      dermoid cyst removed left eyebrow - age 4       Current Outpatient Medications   Medication Sig Dispense Refill    norethindrone (ORTHO MICRONOR) 0.35 MG tablet Take 1 tablet by mouth daily 3 tablet 4    Prenatal MV-Min-Fe Fum-FA-DHA (PRENATAL 1 PO) Take by mouth       No current facility-administered medications for this visit.     Allergies: Patient has no known allergies.     Tobacco History:  reports that she quit smoking about 20 years ago. Her smoking use included

## 2025-05-03 LAB
CYTOLOGIST CVX/VAG CYTO: NORMAL
CYTOLOGY CVX/VAG DOC CYTO: NORMAL
CYTOLOGY CVX/VAG DOC THIN PREP: NORMAL
DX ICD CODE: NORMAL
HPV GENOTYPE REFLEX: NORMAL
HPV I/H RISK 4 DNA CVX QL PROBE+SIG AMP: NEGATIVE
OTHER STN SPEC: NORMAL
SERVICE CMNT-IMP: NORMAL
STAT OF ADQ CVX/VAG CYTO-IMP: NORMAL

## 2025-06-11 RX ORDER — ACETAMINOPHEN AND CODEINE PHOSPHATE 120; 12 MG/5ML; MG/5ML
1 SOLUTION ORAL DAILY
Qty: 84 TABLET | Refills: 4 | OUTPATIENT
Start: 2025-06-11

## (undated) DEVICE — GLOVE SURG SZ 65 THK91MIL LTX FREE SYN POLYISOPRENE

## (undated) DEVICE — TELFA ADHESIVE ISLAND DRESSING: Brand: TELFA

## (undated) DEVICE — STAPLER SKIN H3.9MM WIRE DIA0.58MM CRWN 6.9MM 35 STPL ROT

## (undated) DEVICE — REM POLYHESIVE ADULT PATIENT RETURN ELECTRODE: Brand: VALLEYLAB

## (undated) DEVICE — TRAY,URINE METER,100% SILICONE,16FR10ML: Brand: MEDLINE

## (undated) DEVICE — BLADE ASSEMB CLP HAIR FINE --

## (undated) DEVICE — TRAY PREP DRY W/ PREM GLV 2 APPL 6 SPNG 2 UNDPD 1 OVERWRAP

## (undated) DEVICE — HANDLE SUCT TBNG L6FR DIA3/8IN SWVL W/ M ADPT FOR BERK PMP

## (undated) DEVICE — SYRINGE IRRIG 60ML SFT PLIABLE BLB EZ TO GRP 1 HND USE W/

## (undated) DEVICE — ALCOHOL RUBBING ISO 16OZ 70%

## (undated) DEVICE — TOWEL SURG W17XL27IN STD BLU COT NONFENESTRATED PREWASHED

## (undated) DEVICE — TRAXI PANNICULUS RETRACTOR WITH RETENTUS TECHNOLOGY (BMI 30-50): Brand: TRAXI® PANNICULUS RETRACTOR

## (undated) DEVICE — CURETTE VAC DIA9MM PLAS CRV OPN TIP RIG DISP VACURET D/E

## (undated) DEVICE — COLLECTION KT SUC TISS BERK -- GYRUS

## (undated) DEVICE — ELECTRODE PT RET AD L9FT HI MOIST COND ADH HYDRGEL CORDED

## (undated) DEVICE — TIP CLEANER: Brand: VALLEYLAB

## (undated) DEVICE — STERILE LATEX POWDER-FREE SURGICAL GLOVESWITH NITRILE COATING: Brand: PROTEXIS

## (undated) DEVICE — SOLUTION IV 1000ML 0.9% SOD CHL

## (undated) DEVICE — INTENT OT USE PROVIDES A STERILE INTERFACE BETWEEN THE OPERATING ROOM SURGICAL LAMPS (NON-STERILE) AND THE SURGEON OR STAFF WORKING IN THE STERILE FIELD.: Brand: ASPEN® ALC PLUS LIGHT HANDLE COVER

## (undated) DEVICE — 3000CC GUARDIAN II: Brand: GUARDIAN

## (undated) DEVICE — SOLIDIFIER FLUID 3000 CC ABSORB

## (undated) DEVICE — BLADE CLIPPER GEN PURP NS

## (undated) DEVICE — CATHETER,URETHRAL,REDRUBBER,STRL,16FR: Brand: MEDLINE

## (undated) DEVICE — GOWN,AURORA,FABRIC-REINFORCED,X-LARGE: Brand: MEDLINE

## (undated) DEVICE — SUTURE CHROMIC GUT SZ 1 L36IN ABSRB BRN L40MM CT 1/2 CIR 915H

## (undated) DEVICE — GARMENT,MEDLINE,DVT,INT,CALF,MED, GEN2: Brand: MEDLINE

## (undated) DEVICE — GOWN,SIRUS,NONRNF,SETINSLV,XL,20/CS: Brand: MEDLINE

## (undated) DEVICE — TOWEL,OR,DSP,ST,BLUE,STD,2/PK,40PK/CS: Brand: MEDLINE

## (undated) DEVICE — JELLY,LUBE,STERILE,FLIP TOP,TUBE,4-OZ: Brand: MEDLINE

## (undated) DEVICE — PAD NON-ADHERENT 3X4 STRL LF --

## (undated) DEVICE — STRAP,POSITIONING,KNEE/BODY,FOAM,4X60": Brand: MEDLINE

## (undated) DEVICE — SOL IRR SOD CL 0.9% 1000ML BTL --

## (undated) DEVICE — PENCIL ES L3M ROCK SWCH S STL HEX LOK BLDE ELECTRD HOLSTER

## (undated) DEVICE — 3M™ IOBAN™ 2 ANTIMICROBIAL INCISE DRAPE 6648EZ: Brand: IOBAN™ 2

## (undated) DEVICE — MARKER,SKIN,WI/RULER AND LABELS: Brand: MEDLINE

## (undated) DEVICE — C-SECTION II-LF: Brand: MEDLINE INDUSTRIES, INC.

## (undated) DEVICE — PACK,LITHOTOMY,PK I: Brand: MEDLINE

## (undated) DEVICE — SPONGE: LAP 18X18 W  200/CS: Brand: MEDICAL ACTION INDUSTRIES

## (undated) DEVICE — SPONGE GZ W4XL4IN COT RADPQ HIGHLY ABSRB

## (undated) DEVICE — STERILE POLYISOPRENE POWDER-FREE SURGICAL GLOVES: Brand: PROTEXIS

## (undated) DEVICE — INFECTION CONTROL KIT SYS

## (undated) DEVICE — Z INACTIVE USE 2863041 SPONGE GZ W4XL4IN 100% COT 16 PLY RADPQ HIGHLY ABSRB

## (undated) DEVICE — CLEANER ES TIP W2XL2IN ADH BK RADPQ FOR S STL ELECTRD

## (undated) DEVICE — SUTURE PDS II SZ 1 L36IN ABSRB VLT CT L40MM 1/2 CIR TAPR Z359T

## (undated) DEVICE — COVER LT HNDL PLAS RIG 1 PER PK

## (undated) DEVICE — SHEET,DRAPE,UNDERBUTTOCK,GRAD POUCH,PORT: Brand: MEDLINE

## (undated) DEVICE — DRESSING BORDERED ADH GZ UNIV GEN USE 8INX4IN AND 6INX2IN

## (undated) DEVICE — GLOVE SURG SZ 7 L12IN FNGR THK79MIL GRN LTX FREE

## (undated) DEVICE — HANDLE LT SNAP ON ULT DURABLE LENS FOR TRUMPF ALC DISPOSABLE

## (undated) DEVICE — GARMENT,MEDLINE,DVT,INT,CALF,LG, GEN2: Brand: MEDLINE

## (undated) DEVICE — PAD,SANITARY,11 IN,MAXI,N-STRL,IND WRAP: Brand: MEDLINE

## (undated) DEVICE — ROCKER SWITCH PENCIL HOLSTER: Brand: VALLEYLAB

## (undated) DEVICE — PREP SKN CHLRAPRP APL 26ML STR --

## (undated) DEVICE — SUTURE STRATAFIX SYMMETRIC PDS + SZ 1 L18IN ABSRB VLT L48MM SXPP1A400